# Patient Record
Sex: MALE | Race: WHITE | Employment: FULL TIME | URBAN - METROPOLITAN AREA
[De-identification: names, ages, dates, MRNs, and addresses within clinical notes are randomized per-mention and may not be internally consistent; named-entity substitution may affect disease eponyms.]

---

## 2017-04-03 DIAGNOSIS — E78.5 HYPERLIPIDEMIA LDL GOAL <130: ICD-10-CM

## 2017-04-03 DIAGNOSIS — K21.9 GASTROESOPHAGEAL REFLUX DISEASE WITHOUT ESOPHAGITIS: ICD-10-CM

## 2017-04-04 RX ORDER — SIMVASTATIN 40 MG
40 TABLET ORAL AT BEDTIME
Qty: 30 TABLET | Refills: 0 | Status: SHIPPED | OUTPATIENT
Start: 2017-04-04 | End: 2017-05-04

## 2017-04-04 NOTE — TELEPHONE ENCOUNTER
simvastatin (ZOCOR) 40 MG tablet     Last Written Prescription Date: 03/17/2016  Last Fill Quantity: 90, # refills: 03  Last Office Visit with [facility], [facility] or [facility] prescribing provider: 03/17/2016       Lab Results   Component Value Date    CHOL 168 03/16/2016     Lab Results   Component Value Date    HDL 34 03/16/2016     Lab Results   Component Value Date    LDL 79 03/16/2016     Lab Results   Component Value Date    TRIG 275 03/16/2016     Lab Results   Component Value Date    CHOLHDLRPAULIEO 4.2 11/24/2014

## 2017-04-07 RX ORDER — OMEPRAZOLE 40 MG/1
40 CAPSULE, DELAYED RELEASE ORAL DAILY
Qty: 30 CAPSULE | Refills: 0 | Status: SHIPPED | OUTPATIENT
Start: 2017-04-07 | End: 2017-05-04

## 2017-04-07 NOTE — TELEPHONE ENCOUNTER
omeprazole (PRILOSEC) 40 MG capsule      Last Written Prescription Date: 3/17/16  Last Fill Quantity: 90,  # refills: 0   Last Office Visit with [facility], [facility] or [facility] prescribing provider: 3/17/16

## 2017-04-07 NOTE — TELEPHONE ENCOUNTER
Routing refill request to provider for review/approval because:  Pam given x1 and patient did not follow up, please advise  Patient needs to be seen because it has been more than 1 year since last office visit.

## 2017-05-04 ENCOUNTER — OFFICE VISIT (OUTPATIENT)
Dept: INTERNAL MEDICINE | Facility: CLINIC | Age: 67
End: 2017-05-04
Payer: COMMERCIAL

## 2017-05-04 VITALS
SYSTOLIC BLOOD PRESSURE: 138 MMHG | TEMPERATURE: 98.7 F | OXYGEN SATURATION: 98 % | HEART RATE: 72 BPM | WEIGHT: 169.1 LBS | BODY MASS INDEX: 25.04 KG/M2 | DIASTOLIC BLOOD PRESSURE: 72 MMHG | HEIGHT: 69 IN

## 2017-05-04 DIAGNOSIS — Z23 NEED FOR VACCINATION: ICD-10-CM

## 2017-05-04 DIAGNOSIS — I10 ESSENTIAL HYPERTENSION: Primary | ICD-10-CM

## 2017-05-04 DIAGNOSIS — C61 PROSTATE CANCER (H): ICD-10-CM

## 2017-05-04 DIAGNOSIS — K21.9 GASTROESOPHAGEAL REFLUX DISEASE WITHOUT ESOPHAGITIS: ICD-10-CM

## 2017-05-04 DIAGNOSIS — Z12.5 SCREENING PSA (PROSTATE SPECIFIC ANTIGEN): ICD-10-CM

## 2017-05-04 DIAGNOSIS — E78.5 HYPERLIPIDEMIA LDL GOAL <130: ICD-10-CM

## 2017-05-04 LAB
ALBUMIN SERPL-MCNC: 4.3 G/DL (ref 3.4–5)
ALP SERPL-CCNC: 77 U/L (ref 40–150)
ALT SERPL W P-5'-P-CCNC: 23 U/L (ref 0–70)
ANION GAP SERPL CALCULATED.3IONS-SCNC: 7 MMOL/L (ref 3–14)
AST SERPL W P-5'-P-CCNC: 11 U/L (ref 0–45)
BASOPHILS # BLD AUTO: 0.1 10E9/L (ref 0–0.2)
BASOPHILS NFR BLD AUTO: 1.2 %
BILIRUB SERPL-MCNC: 0.4 MG/DL (ref 0.2–1.3)
BUN SERPL-MCNC: 18 MG/DL (ref 7–30)
CALCIUM SERPL-MCNC: 9.3 MG/DL (ref 8.5–10.1)
CHLORIDE SERPL-SCNC: 103 MMOL/L (ref 94–109)
CHOLEST SERPL-MCNC: 186 MG/DL
CO2 SERPL-SCNC: 29 MMOL/L (ref 20–32)
CREAT SERPL-MCNC: 1.03 MG/DL (ref 0.66–1.25)
DIFFERENTIAL METHOD BLD: NORMAL
EOSINOPHIL # BLD AUTO: 0.2 10E9/L (ref 0–0.7)
EOSINOPHIL NFR BLD AUTO: 3.7 %
ERYTHROCYTE [DISTWIDTH] IN BLOOD BY AUTOMATED COUNT: 13.4 % (ref 10–15)
GFR SERPL CREATININE-BSD FRML MDRD: 72 ML/MIN/1.7M2
GLUCOSE SERPL-MCNC: 111 MG/DL (ref 70–99)
HCT VFR BLD AUTO: 44.2 % (ref 40–53)
HDLC SERPL-MCNC: 38 MG/DL
HGB BLD-MCNC: 14.9 G/DL (ref 13.3–17.7)
LDLC SERPL CALC-MCNC: 83 MG/DL
LYMPHOCYTES # BLD AUTO: 1.7 10E9/L (ref 0.8–5.3)
LYMPHOCYTES NFR BLD AUTO: 26.2 %
MCH RBC QN AUTO: 30.3 PG (ref 26.5–33)
MCHC RBC AUTO-ENTMCNC: 33.7 G/DL (ref 31.5–36.5)
MCV RBC AUTO: 90 FL (ref 78–100)
MONOCYTES # BLD AUTO: 0.7 10E9/L (ref 0–1.3)
MONOCYTES NFR BLD AUTO: 10.6 %
NEUTROPHILS # BLD AUTO: 3.7 10E9/L (ref 1.6–8.3)
NEUTROPHILS NFR BLD AUTO: 58.3 %
NONHDLC SERPL-MCNC: 148 MG/DL
PLATELET # BLD AUTO: 231 10E9/L (ref 150–450)
POTASSIUM SERPL-SCNC: 3.9 MMOL/L (ref 3.4–5.3)
PROT SERPL-MCNC: 7.7 G/DL (ref 6.8–8.8)
PSA SERPL-ACNC: NORMAL UG/L (ref 0–4)
RBC # BLD AUTO: 4.92 10E12/L (ref 4.4–5.9)
SODIUM SERPL-SCNC: 139 MMOL/L (ref 133–144)
TRIGL SERPL-MCNC: 323 MG/DL
WBC # BLD AUTO: 6.4 10E9/L (ref 4–11)

## 2017-05-04 PROCEDURE — 80053 COMPREHEN METABOLIC PANEL: CPT | Performed by: INTERNAL MEDICINE

## 2017-05-04 PROCEDURE — 90670 PCV13 VACCINE IM: CPT | Performed by: INTERNAL MEDICINE

## 2017-05-04 PROCEDURE — 99214 OFFICE O/P EST MOD 30 MIN: CPT | Mod: 25 | Performed by: INTERNAL MEDICINE

## 2017-05-04 PROCEDURE — 85025 COMPLETE CBC W/AUTO DIFF WBC: CPT | Performed by: INTERNAL MEDICINE

## 2017-05-04 PROCEDURE — 36415 COLL VENOUS BLD VENIPUNCTURE: CPT | Performed by: INTERNAL MEDICINE

## 2017-05-04 PROCEDURE — G0103 PSA SCREENING: HCPCS | Performed by: INTERNAL MEDICINE

## 2017-05-04 PROCEDURE — 90471 IMMUNIZATION ADMIN: CPT | Performed by: INTERNAL MEDICINE

## 2017-05-04 PROCEDURE — 80061 LIPID PANEL: CPT | Performed by: INTERNAL MEDICINE

## 2017-05-04 RX ORDER — SIMVASTATIN 40 MG
40 TABLET ORAL AT BEDTIME
Qty: 90 TABLET | Refills: 3 | Status: SHIPPED | OUTPATIENT
Start: 2017-05-04 | End: 2017-10-09

## 2017-05-04 RX ORDER — LISINOPRIL AND HYDROCHLOROTHIAZIDE 12.5; 2 MG/1; MG/1
2 TABLET ORAL EVERY MORNING
Qty: 180 TABLET | Refills: 3 | Status: SHIPPED | OUTPATIENT
Start: 2017-05-04 | End: 2017-10-09

## 2017-05-04 RX ORDER — OMEPRAZOLE 40 MG/1
40 CAPSULE, DELAYED RELEASE ORAL DAILY
Qty: 90 CAPSULE | Refills: 3 | Status: SHIPPED | OUTPATIENT
Start: 2017-05-04 | End: 2017-10-09

## 2017-05-04 NOTE — PATIENT INSTRUCTIONS
"*  Continue all medications at the same doses.  Contact your usual pharmacy if you need refills.     *  Colonoscopy next due 2022    *  Return to see me in 1 year, sooner if needed.  Call [phone] to schedule this appointment.         5 GOALS TO PREVENT VASCULAR DISEASE:     1.  Aggressive blood pressure control, under 130/80 ideally.  Using medications if needed.    Your blood pressure is under good control    BP Readings from Last 4 Encounters:   05/04/17 138/72   03/17/16 126/66   12/01/14 132/74   10/07/13 121/59       2.  Aggressive LDL cholesterol (\"bad cholesterol\") lowering as indicated.    Your goal is an LDL under 130 for sure, preferably under 100.  (If you have diabetes or previous vascular disease, the the LDL goals would be under 100 for sure, preferably under 70.)    New guidelines identify four high-risk groups who could benefit from statins:   *people with pre-existing heart disease, such as those who have had a heart attack;   *people ages 40 to 75 who have diabetes of any type  *patients ages 40 to 75 with at least a 7.5% risk of developing cardiovascular disease over the next decade, according to a formula described in the guidelines  *patients with the sort of super-high cholesterol that sometimes runs in families, as evidenced by an LDL of 190 milligrams per deciliter or higher    Your cholesterol levels are well controlled.    Recent Labs   Lab Test  03/16/16   0837  11/24/14   0813  08/13/13   0927   CHOL  168  170  181   HDL  34*  40*  49   LDL  79  68  95   TRIG  275*  308*  182*   CHOLHDLRATIO   --   4.2  3.7       3.  Aggressive diabetic prevention, screening and/or management.      You do not have diabetes as of the most recent blood tests.     4.  No smoking    5.  Consider taking low dose aspirin (81 mg) tablet once per day over the age of 50, every day unless there is a specific reason that you cannot take aspirin (such as side effect, allergy, or you are on another \"blood " "thinner\").        --Based on your current cardiac risk factors, you should take Aspirin 81 mg once per day if you are over 50 years of age.         "

## 2017-05-04 NOTE — PROGRESS NOTES
SUBJECTIVE:                                                    Willis De La Cruz is a 66 year old male who presents to clinic today for the following health issues:    Pt is fasting    Hyperlipidemia Follow-Up      Rate your low fat/cholesterol diet?: fair    Taking statin?  Yes, no muscle aches from statin    Other lipid medications/supplements?:  None    Has history of hyperlipidemia.  On statin for this, denies any significant side effects of this medication.      Latest labs reviewed:    Recent Labs   Lab Test  05/04/17   0932  03/16/16   0837  11/24/14   0813  08/13/13   0927   CHOL  186  168  170  181   HDL  38*  34*  40*  49   LDL  83  79  68  95   TRIG  323*  275*  308*  182*   CHOLHDLRATIO   --    --   4.2  3.7        Lab Results   Component Value Date    AST 11 05/04/2017          Hypertension Follow-up      Outpatient blood pressures are being checked at home.  Results are 120's/80's.    Low Salt Diet: low salt    Blood presure remains well controlled at home  Readings outside clinic are within normal limits.  Reviewed last 6 BP readings in chart:  BP Readings from Last 6 Encounters:   05/04/17 138/72   03/17/16 126/66   12/01/14 132/74   10/07/13 121/59   10/01/13 122/70   09/26/13 132/70     He has not experienced any significant side effects from medicaiotns for hypertension.    NO active cardiac complaints or symptoms with exercise.        Amount of exercise or physical activity: walks everyday    Problems taking medications regularly: No    Medication side effects: none    Diet: low salt and low fat/cholesterol    Medication Followup of Omeprazole    Taking Medication as prescribed: yes    Side Effects:  None    Medication Helping Symptoms:  Yes    GERD stable.  Taking meds as ordered, no reported side effects from medicines.  Eating properly to avoid sx.   No melena, no hematochezia, no diarrhea.  No unintended weigth loss.            Problem list and histories reviewed & adjusted, as  indicated.  Additional history: as documented        Reviewed and updated as needed this visit by clinical staff  Tobacco  Allergies       Reviewed and updated as needed this visit by Provider           Past Medical History:  ---------------------------  Past Medical History:   Diagnosis Date     Dysmetabolic syndrome X      Esophageal reflux      Hyperlipidaemia      Other and unspecified hyperlipidemia      Prostate cancer (H) 4/10     Unspecified essential hypertension        Past Surgical History:  ---------------------------  Past Surgical History:   Procedure Laterality Date     COLONOSCOPY  4/28/2012    Procedure:COLONOSCOPY; COLONOSCOPY; Surgeon:ERNIE ESPINOSA; Location: GI      ECHO HEART XTHORACIC, STRESS/REST  3/01    Negative, at [place]     HERNIORRHAPHY INGUINAL  10/7/2013    Procedure: HERNIORRHAPHY INGUINAL;  RIGHT INGUINAL HERNIA REPAIR WITH MESH;  Surgeon: Marcus Becker MD;  Location: [facility]     PROSTATE SURGERY  2010       Current Medications:  ---------------------------  Current Outpatient Prescriptions   Medication Sig Dispense Refill     simvastatin (ZOCOR) 40 MG tablet Take 1 tablet (40 mg) by mouth At Bedtime 90 tablet 3     omeprazole (PRILOSEC) 40 MG capsule Take 1 capsule (40 mg) by mouth daily 90 capsule 3     lisinopril-hydrochlorothiazide (PRINZIDE/ZESTORETIC) 20-12.5 MG per tablet Take 2 tablets by mouth every morning 180 tablet 3     ASPIRIN NOT PRESCRIBED (INTENTIONAL) Antiplatelet medication not prescribed intentionally due to Allergy 0 each        Allergies:  -------------  Allergies   Allergen Reactions     Aspirin Hives     Nsaids Hives       Social History:  -------------------  Social History     Social History     Marital status:      Spouse name: N/A     Number of children: N/A     Years of education: N/A     Occupational History     Not on file.     Social History Main Topics     Smoking status: Never Smoker     Smokeless tobacco: Never Used      "Alcohol use Yes      Comment: RARELY     Drug use: No     Sexual activity: Yes     Other Topics Concern     Not on file     Social History Narrative       Family Medical History:  ------------------------------  Family History   Problem Relation Age of Onset     CANCER Father      d age 45 renal cancer     Hypertension Mother      d  age 48 strokes     CEREBROVASCULAR DISEASE Mother      Hypertension Brother      b.  1946  age 59 from cardiomyopathy     Hypertension Brother      b. 1947     Hypertension Sister      b.           ROS:  REVIEW OF SYSTEMS:    RESP: negative for cough, dyspnea, wheezing, hemoptysis  CV: negative for chest pain, palpitations, PND, MARSHALL, orthopnea; reports no changes in their ability to perform physical activity (from cardiovascular standpoint)  GI: negative for dysphagia, N/V, pain, melena, diarrhea and constipation  NEURO: negative for numbness/tingling, paralysis, incoordination, or focal weakness     OBJECTIVE:                                                    /72  Pulse 72  Temp 98.7  F (37.1  C) (Oral)  Ht 5' 9\" (1.753 m)  Wt 169 lb 1.6 oz (76.7 kg)  SpO2 98%  BMI 24.97 kg/m2     GENERAL alert and no distress  EYES:  Normal sclera,conjunctiva, EOMI  HENT: oral and posterior pharynx without lesions or erythema, facies symmetric  NECK: Neck supple. No LAD, without thyroidmegaly or JVD., Carotids without bruits.  RESP: Clear to ausculation bilaterally without wheezes or crackles. Normal BS in all fields.  CV: RRR normal S1S2 without murmurs, rubs or gallops. PMI normal  LYMPH: no cervical lymph adenopathy appreciated  MS: extremities- no gross deformities of the visible extremities noted, no edema  PSYCH: Alert and oriented times 3; speech- coherent  SKIN:  No obvious significant skin lesions on visible portions of face          ASSESSMENT/PLAN:                                                      (I10) Essential hypertension  (primary encounter diagnosis)  Comment: " This condition is currently controlled on the current medical regimen.  Continue current therapy.   Discussed hypertension in detail including JNC VIII guidelines for blood pressure goals.  Discussed indication for treatment and treatment options.  Discussed the importance for aggressive management of HTN to prevent vascular complications later.  Recommended lower fat, lower carbohydrate, and lower sodium (<2000 mg)diet.  Discussed required intervals for follow up on HTN, lab studies, and the need to aggresive management of other cardiac disease risk factors.  Recommened pt. follow their blood pressures outside the clinic to ensure that BPs are remaining within guidelines, and to contact me if the readings are not within guidelines so we can adjust treatment as needed.   Plan: lisinopril-hydrochlorothiazide         (PRINZIDE/ZESTORETIC) 20-12.5 MG per tablet            (C61) Prostate cancer (H)  Comment: stable since prostate removal.   Plan: PSA, screen            (E78.5) Hyperlipidemia LDL goal <130  Comment: Discussed current lipid results, previous results (if available) current guidelines (NCEP) for treatment and goals for lipids.  Discussed lifestyle modification, dietary changes (low fat, low simple carb) and regular aerobic exercise.  Discussed the link between dysmetabolic syndrome and impaired glucose tolerance seen in certain patterns of lipids.  Briefly discussed medication used for lipid lowering, including the statins are their possible side effects of myalgias, rhabdomyolysis, and liver toxicity.   Plan: simvastatin (ZOCOR) 40 MG tablet            (K21.9) Gastroesophageal reflux disease without esophagitis  Comment: This condition is currently controlled on the current medical regimen.  Continue current therapy.   Plan: omeprazole (PRILOSEC) 40 MG capsule            (Z23) Need for vaccination  Comment:   Plan: PNEUMOCOCCAL CONJ VACCINE 13 VALENT IM            (Z12.5) Screening PSA (prostate specific  antigen)  Comment:   Plan: PSA, screen              See Patient Instructions    KRYSTLE ASTORGA M.D., MD  [facility]

## 2017-05-04 NOTE — NURSING NOTE
"Chief Complaint   Patient presents with     Hypertension     med recheck     Lipids     med recheck     Gastrophageal Reflux     med recheck       Initial /72  Pulse 72  Temp 98.7  F (37.1  C) (Oral)  Ht 5' 9\" (1.753 m)  Wt 169 lb 1.6 oz (76.7 kg)  SpO2 98%  BMI 24.97 kg/m2 Estimated body mass index is 24.97 kg/(m^2) as calculated from the following:    Height as of this encounter: 5' 9\" (1.753 m).    Weight as of this encounter: 169 lb 1.6 oz (76.7 kg).  Medication Reconciliation: complete   Lucita Jacob CMA    Screening Questionnaire for Adult Immunization    Are you sick today?   No   Do you have allergies to medications, food, a vaccine component or latex?   Yes   Have you ever had a serious reaction after receiving a vaccination?   No   Do you have a long-term health problem with heart disease, lung disease, asthma, kidney disease, metabolic disease (e.g. diabetes), anemia, or other blood disorder?   Yes   Do you have cancer, leukemia, HIV/AIDS, or any other immune system problem?   Yes   In the past 3 months, have you taken medications that affect  your immune system, such as prednisone, other steroids, or anticancer drugs; drugs for the treatment of rheumatoid arthritis, Crohn s disease, or psoriasis; or have you had radiation treatments?   No   Have you had a seizure, or a brain or other nervous system problem?   No   During the past year, have you received a transfusion of blood or blood     products, or been given immune (gamma) globulin or antiviral drug?   No   For women: Are you pregnant or is there a chance you could become        pregnant during the next month?   No   Have you received any vaccinations in the past 4 weeks?   No     Immunization questionnaire was positive for at least one answer.  Notified Smith.      MNVFC doesn't apply on this patient    Per orders of Dr. Espinoza, injection of prevnar 13 given by Lucita Jacob. Patient instructed to remain in clinic for 20 minutes " afterwards, and to report any adverse reaction to me immediately.       Screening performed by Lucita Jacob on 5/4/2017 at 8:54 AM.

## 2017-05-04 NOTE — MR AVS SNAPSHOT
"              After Visit Summary   5/4/2017    Willis De La Cruz    MRN: [id number]           Patient Information     Date Of Birth          1950        Visit Information        Provider Department      5/4/2017 8:40 AM Quintin Espinoza MD [facility]        Today's Diagnoses     Essential hypertension    -  1    Prostate cancer (H)        Hyperlipidemia LDL goal <130        Gastroesophageal reflux disease without esophagitis        Need for vaccination        Screening PSA (prostate specific antigen)          Care Instructions    *  Continue all medications at the same doses.  Contact your usual pharmacy if you need refills.     *  Colonoscopy next due 2022    *  Return to see me in 1 year, sooner if needed.  Call [phone] to schedule this appointment.         5 GOALS TO PREVENT VASCULAR DISEASE:     1.  Aggressive blood pressure control, under 130/80 ideally.  Using medications if needed.    Your blood pressure is under good control    BP Readings from Last 4 Encounters:   05/04/17 138/72   03/17/16 126/66   12/01/14 132/74   10/07/13 121/59       2.  Aggressive LDL cholesterol (\"bad cholesterol\") lowering as indicated.    Your goal is an LDL under 130 for sure, preferably under 100.  (If you have diabetes or previous vascular disease, the the LDL goals would be under 100 for sure, preferably under 70.)    New guidelines identify four high-risk groups who could benefit from statins:   *people with pre-existing heart disease, such as those who have had a heart attack;   *people ages 40 to 75 who have diabetes of any type  *patients ages 40 to 75 with at least a 7.5% risk of developing cardiovascular disease over the next decade, according to a formula described in the guidelines  *patients with the sort of super-high cholesterol that sometimes runs in families, as evidenced by an LDL of 190 milligrams per deciliter or higher    Your cholesterol levels are well " "controlled.    Recent Labs   Lab Test  03/16/16   0837  11/24/14   0813  08/13/13   0927   CHOL  168  170  181   HDL  34*  40*  49   LDL  79  68  95   TRIG  275*  308*  182*   CHOLHDLRATIO   --   4.2  3.7       3.  Aggressive diabetic prevention, screening and/or management.      You do not have diabetes as of the most recent blood tests.     4.  No smoking    5.  Consider taking low dose aspirin (81 mg) tablet once per day over the age of 50, every day unless there is a specific reason that you cannot take aspirin (such as side effect, allergy, or you are on another \"blood thinner\").        --Based on your current cardiac risk factors, you should take Aspirin 81 mg once per day if you are over 50 years of age.               Follow-ups after your visit        Who to contact     If you have questions or need follow up information about today's clinic visit or your schedule please contact [facility] directly at [phone].  Normal or non-critical lab and imaging results will be communicated to you by GetBulbhart, letter or phone within 4 business days after the clinic has received the results. If you do not hear from us within 7 days, please contact the clinic through Nutmeg or phone. If you have a critical or abnormal lab result, we will notify you by phone as soon as possible.  Submit refill requests through Nutmeg or call your pharmacy and they will forward the refill request to us. Please allow 3 business days for your refill to be completed.          Additional Information About Your Visit        Nutmeg Information     Nutmeg gives you secure access to your electronic health record. If you see a primary care provider, you can also send messages to your care team and make appointments. If you have questions, please call your primary care clinic.  If you do not have a primary care provider, please call [phone] and they will assist you.        Care EveryWhere ID     This is your Care " "EveryWhere ID. This could be used by other organizations to access your [place] medical records  [id number]        Your Vitals Were     Pulse Temperature Height Pulse Oximetry BMI (Body Mass Index)       72 98.7  F (37.1  C) (Oral) 5' 9\" (1.753 m) 98% 24.97 kg/m2        Blood Pressure from Last 3 Encounters:   05/04/17 138/72   03/17/16 126/66   12/01/14 132/74    Weight from Last 3 Encounters:   05/04/17 169 lb 1.6 oz (76.7 kg)   03/17/16 165 lb 8 oz (75.1 kg)   12/01/14 171 lb (77.6 kg)              We Performed the Following     CBC with platelets and differential     Comprehensive metabolic panel     Lipid Profile with reflex to direct LDL     PNEUMOCOCCAL CONJ VACCINE 13 VALENT IM     PSA, screen          Where to get your medicines      These medications were sent to [facility] [address]  [address] [address]     Phone:  [phone]     lisinopril-hydrochlorothiazide 20-12.5 MG per tablet    omeprazole 40 MG capsule    simvastatin 40 MG tablet          Primary Care Provider Office Phone # Fax #    Quintin Espinoza [phone]       [facility] [address] [place] [address]        Thank you!     Thank you for choosing [facility]  for your care. Our goal is always to provide you with excellent care. Hearing back from our patients is one way we can continue to improve our services. Please take a few minutes to complete the written survey that you may receive in the mail after your visit with us. Thank you!             Your Updated Medication List - Protect others around you: Learn how to safely use, store and throw away your medicines at www.disposemymeds.org.          This list is accurate as of: 5/4/17  9:23 AM.  Always use your most recent med list.                   Brand Name Dispense Instructions for use    ASPIRIN NOT PRESCRIBED    INTENTIONAL    0 each    Antiplatelet medication not " prescribed intentionally due to Allergy       lisinopril-hydrochlorothiazide 20-12.5 MG per tablet    PRINZIDE/ZESTORETIC    180 tablet    Take 2 tablets by mouth every morning       omeprazole 40 MG capsule    priLOSEC    90 capsule    Take 1 capsule (40 mg) by mouth daily       simvastatin 40 MG tablet    ZOCOR    90 tablet    Take 1 tablet (40 mg) by mouth At Bedtime

## 2017-10-09 DIAGNOSIS — E78.5 HYPERLIPIDEMIA LDL GOAL <130: ICD-10-CM

## 2017-10-09 DIAGNOSIS — I10 ESSENTIAL HYPERTENSION: ICD-10-CM

## 2017-10-09 DIAGNOSIS — K21.9 GASTROESOPHAGEAL REFLUX DISEASE WITHOUT ESOPHAGITIS: ICD-10-CM

## 2017-10-09 NOTE — TELEPHONE ENCOUNTER
lisinopril-hydrochlorothiazide (PRINZIDE/ZESTORETIC) 20-12.5 MG per tablet      Last Written Prescription Date: 5/4/17  Last Fill Quantity: 180, # refills: 3  [place] [facility] or [facility] prescribing provider: 5/4/17       Potassium   Date Value Ref Range Status   05/04/2017 3.9 3.4 - 5.3 mmol/L Final     Creatinine   Date Value Ref Range Status   05/04/2017 1.03 0.66 - 1.25 mg/dL Final     BP Readings from Last 3 Encounters:   05/04/17 138/72   03/17/16 126/66   12/01/14 132/74     omeprazole (PRILOSEC) 40 MG capsule      Last Written Prescription Date: 5/4/17  Last Fill Quantity: 90,  # refills: 3   [place] [facility] or [facility] prescribing provider: 5/4/17    simvastatin (ZOCOR) 40 MG tablet     Last Written Prescription Date: 5/4/17  Last Fill Quantity: 90, # refills: 3  Last [place] Visit [place] [facility] or [facility] prescribing provider: 5/4/17       Lab Results   Component Value Date    CHOL 186 05/04/2017     Lab Results   Component Value Date    HDL 38 05/04/2017     Lab Results   Component Value Date    LDL 83 05/04/2017     Lab Results   Component Value Date    TRIG 323 05/04/2017     Lab Results   Component Value Date    CHOLHDLRATIO 4.2 11/24/2014

## 2017-10-10 RX ORDER — OMEPRAZOLE 40 MG/1
40 CAPSULE, DELAYED RELEASE ORAL DAILY
Qty: 90 CAPSULE | Refills: 1 | Status: SHIPPED | OUTPATIENT
Start: 2017-10-10 | End: 2018-04-08

## 2017-10-10 RX ORDER — LISINOPRIL AND HYDROCHLOROTHIAZIDE 12.5; 2 MG/1; MG/1
2 TABLET ORAL EVERY MORNING
Qty: 180 TABLET | Refills: 1 | Status: SHIPPED | OUTPATIENT
Start: 2017-10-10 | End: 2018-04-08

## 2017-10-10 RX ORDER — SIMVASTATIN 40 MG
40 TABLET ORAL AT BEDTIME
Qty: 90 TABLET | Refills: 1 | Status: SHIPPED | OUTPATIENT
Start: 2017-10-10 | End: 2018-04-08

## 2017-10-11 ENCOUNTER — TELEPHONE (OUTPATIENT)
Dept: INTERNAL MEDICINE | Facility: CLINIC | Age: 67
End: 2017-10-11

## 2017-10-11 DIAGNOSIS — K21.9 GASTROESOPHAGEAL REFLUX DISEASE WITHOUT ESOPHAGITIS: ICD-10-CM

## 2017-10-11 DIAGNOSIS — E78.5 HYPERLIPIDEMIA LDL GOAL <130: ICD-10-CM

## 2017-10-11 RX ORDER — OMEPRAZOLE 40 MG/1
40 CAPSULE, DELAYED RELEASE ORAL DAILY
Qty: 90 CAPSULE | Refills: 1 | OUTPATIENT
Start: 2017-10-11

## 2017-10-11 NOTE — TELEPHONE ENCOUNTER
simvastatin (ZOCOR) 40 MG tablet     Last Written Prescription Date: 5/4/17  Last Fill Quantity: 90, # refills: 1  Last Office Visit with [facility], [facility] or [facility] prescribing provider: 10/10/17       Lab Results   Component Value Date    CHOL 186 05/04/2017     Lab Results   Component Value Date    HDL 38 05/04/2017     Lab Results   Component Value Date    LDL 83 05/04/2017     Lab Results   Component Value Date    TRIG 323 05/04/2017     Lab Results   Component Value Date    CHOLHDLRPAULIEO 4.2 11/24/2014

## 2017-10-11 NOTE — TELEPHONE ENCOUNTER
omeprazole (PRILOSEC) 40 MG capsule      Last Written Prescription Date: 5/4/17  Last Fill Quantity: 90,  # refills: 1   Last Office Visit with [facility], [facility] or [facility] prescribing provider: 10/10/17

## 2018-04-08 DIAGNOSIS — K21.9 GASTROESOPHAGEAL REFLUX DISEASE WITHOUT ESOPHAGITIS: ICD-10-CM

## 2018-04-08 DIAGNOSIS — E78.5 HYPERLIPIDEMIA LDL GOAL <130: ICD-10-CM

## 2018-04-08 DIAGNOSIS — I10 ESSENTIAL HYPERTENSION: ICD-10-CM

## 2018-04-10 RX ORDER — SIMVASTATIN 40 MG
TABLET ORAL
Qty: 90 TABLET | Refills: 0 | Status: SHIPPED | OUTPATIENT
Start: 2018-04-10 | End: 2018-07-09

## 2018-04-10 RX ORDER — OMEPRAZOLE 40 MG/1
CAPSULE, DELAYED RELEASE ORAL
Qty: 90 CAPSULE | Refills: 0 | Status: SHIPPED | OUTPATIENT
Start: 2018-04-10 | End: 2018-07-09

## 2018-04-10 RX ORDER — LISINOPRIL AND HYDROCHLOROTHIAZIDE 12.5; 2 MG/1; MG/1
TABLET ORAL
Qty: 180 TABLET | Refills: 0 | Status: SHIPPED | OUTPATIENT
Start: 2018-04-10 | End: 2018-07-09

## 2018-04-10 NOTE — TELEPHONE ENCOUNTER
"Prescription approved per [facility]ill Protocol.    Requested Prescriptions   Pending Prescriptions Disp Refills     lisinopril-hydrochlorothiazide (PRINZIDE/ZESTORETIC) 20-12.5 MG per tablet [Pharmacy Med Name: LISINOPRIL/HCTZ 20/12.5M TA 20-12.5 TAB] 180 tablet 1     Sig: TAKE 2 TABLETS BY MOUTH EVERY MORNING    Diuretics (Including Combos) Protocol Passed    4/8/2018 10:29 AM       Passed - Blood pressure under 140/90 in past 12 months    BP Readings from Last 3 Encounters:   05/04/17 138/72   03/17/16 126/66   12/01/14 132/74                Passed - Recent (12 mo) or future (30 days) visit within the authorizing provider's specialty    Patient had office visit in the last 12 months or has a visit in the next 30 days with authorizing provider or within the authorizing provider's specialty.  See \"Patient Info\" tab in inbasket, or \"Choose Columns\" in Meds & Orders section of the refill encounter.           Passed - Patient is age 18 or older       Passed - Normal serum creatinine on file in past 12 months    Recent Labs   Lab Test  05/04/17   0932   CR  1.03             Passed - Normal serum potassium on file in past 12 months    Recent Labs   Lab Test  05/04/17   0932   POTASSIUM  3.9                   Passed - Normal serum sodium on file in past 12 months    Recent Labs   Lab Test  05/04/17   0932   NA  139              omeprazole (PRILOSEC) 40 MG capsule [Pharmacy Med Name: OMEPRAZOLE 40MG CAP 40 CAP] 90 capsule 1     Sig: TAKE 1 CAPSULE (40 MG) BY MOUTH DAILY    PPI Protocol Passed    4/8/2018 10:29 AM       Passed - Not on Clopidogrel (unless Pantoprazole ordered)       Passed - No diagnosis of osteoporosis on record       Passed - Recent (12 mo) or future (30 days) visit within the authorizing provider's specialty    Patient had office visit in the last 12 months or has a visit in the next 30 days with authorizing provider or within the authorizing provider's specialty.  See \"Patient Info\" tab in inbasket, or " "\"Choose Columns\" in Meds & Orders section of the refill encounter.           Passed - Patient is age 18 or older        simvastatin (ZOCOR) 40 MG tablet [Pharmacy Med Name: SIMVASTATIN 40MG TAB 40 TAB] 90 tablet 1     Sig: TAKE 1 TABLET (40 MG) BY MOUTH AT BEDTIME    Statins Protocol Passed    4/8/2018 10:29 AM       Passed - LDL on file in past 12 months    Recent Labs   Lab Test  05/04/17   0932   LDL  83            Passed - No abnormal creatine kinase in past 12 months    No lab results found.            Passed - Recent (12 mo) or future (30 days) visit within the authorizing provider's specialty    Patient had office visit in the last 12 months or has a visit in the next 30 days with authorizing provider or within the authorizing provider's specialty.  See \"Patient Info\" tab in inbasket, or \"Choose Columns\" in Meds & Orders section of the refill encounter.           Passed - Patient is age 18 or older          "

## 2018-07-09 ENCOUNTER — OFFICE VISIT (OUTPATIENT)
Dept: INTERNAL MEDICINE | Facility: CLINIC | Age: 68
End: 2018-07-09
Payer: COMMERCIAL

## 2018-07-09 VITALS
DIASTOLIC BLOOD PRESSURE: 76 MMHG | TEMPERATURE: 98.2 F | HEIGHT: 69 IN | BODY MASS INDEX: 25.74 KG/M2 | OXYGEN SATURATION: 98 % | RESPIRATION RATE: 10 BRPM | HEART RATE: 70 BPM | SYSTOLIC BLOOD PRESSURE: 136 MMHG | WEIGHT: 173.8 LBS

## 2018-07-09 DIAGNOSIS — I10 ESSENTIAL HYPERTENSION: ICD-10-CM

## 2018-07-09 DIAGNOSIS — C61 PROSTATE CANCER (H): ICD-10-CM

## 2018-07-09 DIAGNOSIS — K21.9 GASTROESOPHAGEAL REFLUX DISEASE WITHOUT ESOPHAGITIS: ICD-10-CM

## 2018-07-09 DIAGNOSIS — Z00.00 MEDICARE ANNUAL WELLNESS VISIT, SUBSEQUENT: Primary | ICD-10-CM

## 2018-07-09 DIAGNOSIS — Z12.5 SPECIAL SCREENING FOR MALIGNANT NEOPLASM OF PROSTATE: ICD-10-CM

## 2018-07-09 DIAGNOSIS — E78.5 HYPERLIPIDEMIA LDL GOAL <130: ICD-10-CM

## 2018-07-09 DIAGNOSIS — Z11.59 NEED FOR HEPATITIS C SCREENING TEST: ICD-10-CM

## 2018-07-09 DIAGNOSIS — Z23 NEED FOR PROPHYLACTIC VACCINATION AGAINST STREPTOCOCCUS PNEUMONIAE (PNEUMOCOCCUS): ICD-10-CM

## 2018-07-09 LAB
ALT SERPL W P-5'-P-CCNC: 29 U/L (ref 0–70)
ANION GAP SERPL CALCULATED.3IONS-SCNC: 8 MMOL/L (ref 3–14)
AST SERPL W P-5'-P-CCNC: 17 U/L (ref 0–45)
BUN SERPL-MCNC: 16 MG/DL (ref 7–30)
CALCIUM SERPL-MCNC: 9.2 MG/DL (ref 8.5–10.1)
CHLORIDE SERPL-SCNC: 102 MMOL/L (ref 94–109)
CHOLEST SERPL-MCNC: 202 MG/DL
CO2 SERPL-SCNC: 29 MMOL/L (ref 20–32)
CREAT SERPL-MCNC: 1.09 MG/DL (ref 0.66–1.25)
GFR SERPL CREATININE-BSD FRML MDRD: 67 ML/MIN/1.7M2
GLUCOSE SERPL-MCNC: 122 MG/DL (ref 70–99)
HCV AB SERPL QL IA: NONREACTIVE
HDLC SERPL-MCNC: 40 MG/DL
HGB BLD-MCNC: 16.2 G/DL (ref 13.3–17.7)
LDLC SERPL CALC-MCNC: 91 MG/DL
NONHDLC SERPL-MCNC: 162 MG/DL
POTASSIUM SERPL-SCNC: 3.6 MMOL/L (ref 3.4–5.3)
PSA SERPL-ACNC: <0.01 UG/L (ref 0–4)
SODIUM SERPL-SCNC: 139 MMOL/L (ref 133–144)
TRIGL SERPL-MCNC: 355 MG/DL

## 2018-07-09 PROCEDURE — 84450 TRANSFERASE (AST) (SGOT): CPT | Performed by: INTERNAL MEDICINE

## 2018-07-09 PROCEDURE — 85018 HEMOGLOBIN: CPT | Performed by: INTERNAL MEDICINE

## 2018-07-09 PROCEDURE — 90471 IMMUNIZATION ADMIN: CPT | Performed by: INTERNAL MEDICINE

## 2018-07-09 PROCEDURE — 99397 PER PM REEVAL EST PAT 65+ YR: CPT | Mod: 25 | Performed by: INTERNAL MEDICINE

## 2018-07-09 PROCEDURE — 90732 PPSV23 VACC 2 YRS+ SUBQ/IM: CPT | Performed by: INTERNAL MEDICINE

## 2018-07-09 PROCEDURE — 36415 COLL VENOUS BLD VENIPUNCTURE: CPT | Performed by: INTERNAL MEDICINE

## 2018-07-09 PROCEDURE — 80048 BASIC METABOLIC PNL TOTAL CA: CPT | Performed by: INTERNAL MEDICINE

## 2018-07-09 PROCEDURE — 86803 HEPATITIS C AB TEST: CPT | Performed by: INTERNAL MEDICINE

## 2018-07-09 PROCEDURE — 80061 LIPID PANEL: CPT | Performed by: INTERNAL MEDICINE

## 2018-07-09 PROCEDURE — G0103 PSA SCREENING: HCPCS | Performed by: INTERNAL MEDICINE

## 2018-07-09 PROCEDURE — 84460 ALANINE AMINO (ALT) (SGPT): CPT | Performed by: INTERNAL MEDICINE

## 2018-07-09 RX ORDER — SIMVASTATIN 40 MG
40 TABLET ORAL AT BEDTIME
Qty: 90 TABLET | Refills: 3 | Status: SHIPPED | OUTPATIENT
Start: 2018-07-09 | End: 2019-07-09

## 2018-07-09 RX ORDER — LISINOPRIL AND HYDROCHLOROTHIAZIDE 12.5; 2 MG/1; MG/1
2 TABLET ORAL EVERY MORNING
Qty: 180 TABLET | Refills: 3 | Status: SHIPPED | OUTPATIENT
Start: 2018-07-09 | End: 2019-07-31

## 2018-07-09 RX ORDER — OMEPRAZOLE 40 MG/1
CAPSULE, DELAYED RELEASE ORAL
Qty: 90 CAPSULE | Refills: 0 | Status: CANCELLED | OUTPATIENT
Start: 2018-07-09

## 2018-07-09 RX ORDER — OMEPRAZOLE 40 MG/1
40 CAPSULE, DELAYED RELEASE ORAL DAILY
Qty: 90 CAPSULE | Refills: 3 | Status: SHIPPED | OUTPATIENT
Start: 2018-07-09 | End: 2019-07-31

## 2018-07-09 ASSESSMENT — ACTIVITIES OF DAILY LIVING (ADL)
I_NEED_ASSISTANCE_FOR_THE_FOLLOWING_DAILY_ACTIVITIES:: NO ASSISTANCE IS NEEDED
CURRENT_FUNCTION: NO ASSISTANCE NEEDED

## 2018-07-09 NOTE — PROGRESS NOTES
SUBJECTIVE:   Willis De La Cruz is a 68 year old male who presents for Preventive Visit.    Are you in the first 12 months of your Medicare coverage?  No     Physical   Annual:     Getting at least 3 servings of Calcium per day:  Yes    Bi-annual eye exam:  Yes    Dental care twice a year:  Yes    Sleep apnea or symptoms of sleep apnea:  None    Diet:  Regular (no restrictions) and Breakfast skipped    Frequency of exercise:  1 day/week    Duration of exercise:  Less than 15 minutes    Taking medications regularly:  Yes    Medication side effects:  None    Additional concerns today:  No    Ability to successfully perform activities of daily living: no assistance needed    Home Safety:  No safety concerns identified    Hearing Impairment: difficulty following a conversation in a noisy restaurant or crowded room      1.    Blood presure remains well controlled at home  Readings outside clinic are within normal limits.  Reviewed last 6 BP readings in chart:  BP Readings from Last 6 Encounters:   07/09/18 136/76   05/04/17 138/72   03/17/16 126/66   12/01/14 132/74   10/07/13 121/59   10/01/13 122/70     He has not experienced any significant side effects from medicaiotns for hypertension.    NO active cardiac complaints or symptoms with exercise.     2.  Has history of hyperlipidemia.  On statin for this, denies any significant side effects of this medication.      Latest labs reviewed:    Recent Labs   Lab Test  05/04/17   0932  03/16/16   0837  11/24/14   0813  08/13/13   0927   CHOL  186  168  170  181   HDL  38*  34*  40*  49   LDL  83  79  68  95   TRIG  323*  275*  308*  182*   CHOLHDLRATIO   --    --   4.2  3.7        Lab Results   Component Value Date    AST 11 05/04/2017         3.  history of prostate cancer, s/p [id number].   No  sx at this lalo es  Labs gabby;l    Lab Results   Component Value Date    PSA  05/04/2017     <0.01  Assay Method:  Chemiluminescence using Siemens Vista analyzer      PSA   03/17/2016     <0.01  PSA results are about 7% lower than our prior method due to a methodology change   on August 30, 2011.   Results confirmed by repeat test      PSA <0.10 07/16/2014    PSA 0.04 01/08/2013    PSA <0.04 06/06/2012    PSA 0.04 02/22/2012    PSA < 0.04 11/30/2011    PSA < 0.04 08/30/2011    PSA 0.04 05/23/2011    PSA 0.04 02/28/2011    PSA 0.04 11/29/2010    PSA 0 @ 09/02/2010    PSA 5.49 @ 01/06/2010    PSA 4.49 12/28/2009          Fall risk:  Fallen 2 or more times in the past year?: No  Any fall with injury in the past year?: No    COGNITIVE SCREEN  1) Repeat 3 items (Leader, Season, Table)    2) Clock draw: NORMAL  3) 3 item recall: Recalls 3 objects  Results: 3 items recalled: COGNITIVE IMPAIRMENT LESS LIKELY    Mini-CogTM Copyright [employer]. Licensed by the author for use in [facility]; reprinted with permission (soob@[facility]). All rights reserved.     Reviewed and updated as needed this visit by clinical staff  Tobacco  Allergies  Meds         Reviewed and updated as needed this visit by Provider  Tobacco        Social History   Substance Use Topics     Smoking status: Never Smoker     Smokeless tobacco: Never Used     Alcohol use Yes      Comment: RARELY       Alcohol Use 7/9/2018   If you drink alcohol do you typically have greater than 3 drinks per day OR greater than 7 drinks per week? No     Today's PHQ-2 Score:   PHQ-2 ( 1999 [employer]) 7/9/2018   Q1: Little interest or pleasure in doing things 0   Q2: Feeling down, depressed or hopeless 0   PHQ-2 Score 0   Q1: Little interest or pleasure in doing things Not at all   Q2: Feeling down, depressed or hopeless Not at all   PHQ-2 Score 0       Do you feel safe in your environment - Yes    Do you have a Health Care Directive?: No: Advance care planning reviewed with patient; information given to patient to review.    Current providers sharing in care for this patient include:   Patient Care Team:  Quintin Espinoza MD as  PCP - General    The following health maintenance items are reviewed in Epic and correct as of today:  Health Maintenance   Topic Date Due     HEPATITIS C SCREENING  05/30/1968     AORTIC ANEURYSM SCREENING (SYSTEM ASSIGNED)  05/30/2014     ADVANCE DIRECTIVE PLANNING Q5 YRS  11/26/2017     ALT Q1 YR  05/04/2018     BMP Q1 YR  05/04/2018     FALL RISK ASSESSMENT  05/04/2018     LIPID MONITORING Q1 YEAR  05/04/2018     PNEUMOCOCCAL (2 of 2 - PPSV23) 05/04/2018     PHQ-2 Q1 YR  05/04/2018     INFLUENZA VACCINE (1) 09/01/2018     COLON CANCER SCREEN (SYSTEM ASSIGNED)  04/28/2022     TETANUS IMMUNIZATION (SYSTEM ASSIGNED)  03/17/2026         Past Medical History:  ---------------------------  Past Medical History:   Diagnosis Date     Dysmetabolic syndrome X      Esophageal reflux      Hyperlipidaemia      Other and unspecified hyperlipidemia      Prostate cancer (H) 4/10     Unspecified essential hypertension        Past Surgical History:  ---------------------------  Past Surgical History:   Procedure Laterality Date     COLONOSCOPY  4/28/2012    Procedure:COLONOSCOPY; COLONOSCOPY; Surgeon:ERNIE ESPINOSA; Location: GI     HC ECHO HEART XTHORACIC, STRESS/REST  3/01    Negative, at [place]     HERNIORRHAPHY INGUINAL  10/7/2013    Procedure: HERNIORRHAPHY INGUINAL;  RIGHT INGUINAL HERNIA REPAIR WITH MESH;  Surgeon: Marcus Becker MD;  Location: [facility]     PROSTATE SURGERY  2010       Current Medications:  ---------------------------  Current Outpatient Prescriptions   Medication Sig Dispense Refill     ASPIRIN NOT PRESCRIBED (INTENTIONAL) Antiplatelet medication not prescribed intentionally due to Allergy 0 each      lisinopril-hydrochlorothiazide (PRINZIDE/ZESTORETIC) 20-12.5 MG per tablet Take 2 tablets by mouth every morning 180 tablet 3     omeprazole (PRILOSEC) 40 MG capsule Take 1 capsule (40 mg) by mouth daily 90 capsule 3     simvastatin (ZOCOR) 40 MG tablet Take 1 tablet (40 mg) by mouth At Bedtime 90  "tablet 3     [DISCONTINUED] lisinopril-hydrochlorothiazide (PRINZIDE/ZESTORETIC) 20-12.5 MG per tablet TAKE 2 TABLETS BY MOUTH EVERY MORNING 180 tablet 0     [DISCONTINUED] simvastatin (ZOCOR) 40 MG tablet TAKE 1 TABLET (40 MG) BY MOUTH AT BEDTIME 90 tablet 0       Allergies:  -------------  Allergies   Allergen Reactions     Aspirin Hives     Nsaids Hives       Social History:  -------------------  Social History     Social History     Marital status:      Spouse name: N/A     Number of children: N/A     Years of education: N/A     Occupational History     Not on file.     Social History Main Topics     Smoking status: Never Smoker     Smokeless tobacco: Never Used     Alcohol use Yes      Comment: RARELY     Drug use: No     Sexual activity: Yes     Other Topics Concern     Not on file     Social History Narrative       Family Medical History:  ------------------------------  Family History   Problem Relation Age of Onset     Cancer Father      d age 45 renal cancer     Hypertension Mother      d  age 48 strokes     Cerebrovascular Disease Mother      Hypertension Brother      b.  1946  age 59 from cardiomyopathy     Hypertension Brother      b. 7     Hypertension Sister      b.          Review of Systems  Constitutional, HEENT, cardiovascular, pulmonary, GI, , musculoskeletal, neuro, skin, endocrine and psych systems are negative, except as otherwise noted.    OBJECTIVE:   /76  Pulse 70  Temp 98.2  F (36.8  C) (Oral)  Resp 10  Ht 5' 9\" (1.753 m)  Wt 173 lb 12.8 oz (78.8 kg)  SpO2 98%  BMI 25.67 kg/m2 Estimated body mass index is 25.67 kg/(m^2) as calculated from the following:    Height as of this encounter: 5' 9\" (1.753 m).    Weight as of this encounter: 173 lb 12.8 oz (78.8 kg).  Physical Exam    GENERAL alert and no distress.  EYES conjunctivae/corneas clear. PERRL, EOM's intact  HENT: NCAT,oral and posterior pharynx without lesions or erythema, facies symmetric  NECK: Neck " supple. No LAD, without thyroidmegaly or JVD.  RESP: Clear to ausculation bilaterally without wheezes or crackles. Normal BS in all fields.  CV: RRR normal S1S2 without  murmurs, rubs or gallops. PMI normal  LYMPH: no cervical lymph adenopathy appreciated  GI: NTND, no organomegaly, normal BS in all quadrants, without rebound or guarding  MS: No cyanosis, clubbing or edema noted bilaterally in Upper and/or Lower Extremities  SKIN: no significant ulcers, lesions or rashes on the visualized portions of the skin  NEURO: Alert and Oriented x 3, Gait normal. Reflexes normal and symmetric. Sensation grossly WNL..  PSYCH: Alert and oriented times 3; speech- coherent , normal rate and volume; able to articulate logical thoughts, able to abstract reason, no tangential thoughts, no hallucinations or delusions, affect- normal         ASSESSMENT / PLAN:     (Z00.00) Medicare annual wellness visit, subsequent  (primary encounter diagnosis)  Comment: Discussed cardiac disease risk factors and cardiac disease risk factor modification, including diabetes screening, blood pressure screening (and management if indicated), and cholesterol screening.   Reviewed immunzation guidelines, including pneumococcal vaccines, annual influenza, and shingles vaccines.   Discussed routine cancer screenings, including skin cancer, colon cancer screening for everyone until age 80, prostate cancer screening in men until age 75, mammogram and PAP/pelvic for women until age 75.   Recommended regular dentist visits to care for remaining teeth.   Recommended regular screening for vision and glaucoma.   Recommended safe driving and accident avoidance.   Plan:     (E78.5) Hyperlipidemia LDL goal <130  Comment: Discussed current lipid results, previous results (if available) current guidelines (NCEP) for treatment and goals for lipids.  Discussed lifestyle modification, dietary changes (low fat, low simple carb) and regular aerobic exercise.  Discussed the  link between dysmetabolic syndrome and impaired glucose tolerance seen in certain patterns of lipids.  Briefly discussed medication used for lipid lowering, including the statins are their possible side effects of myalgias, rhabdomyolysis, and liver toxicity.   Plan: ALT, Lipid panel reflex to direct LDL Fasting,         simvastatin (ZOCOR) 40 MG tablet, AST, **ALT         FUTURE 1yr, Lipid panel reflex to direct LDL         Fasting            (I10) Essential hypertension  Comment: /This condition is currently controlled on the current medical regimen.  Continue current therapy.  Discussed current hypertension treatment guidelines, including indications for treatment and treatment options.  Discussed the importance for aggressive management of HTN to prevent vascular complications later.  Recommended lower fat, lower carbohydrate, and lower sodium (<2000 mg)diet.  Discussed required intervals for follow up on HTN, lab studies.  Recommened pt. follow their blood pressures outside the clinic to ensure that BPs are remaining within guidelines, and to contact me if the readings are not within guidelines on a regular basis so we can adjust treatment as needed.   Plan: BASIC METABOLIC PANEL,         lisinopril-hydrochlorothiazide         (PRINZIDE/ZESTORETIC) 20-12.5 MG per tablet,         Hemoglobin, **Basic metabolic panel FUTURE 1yr,        **CBC with platelets FUTURE 1yr            (C61) Prostate cancer (H)  Comment: This condition is currently controlled on the current medical regimen.  Continue current therapy.   Plan:     (Z11.59) Need for hepatitis C screening test  Comment:   Plan: Hepatitis C Screen Reflex to HCV RNA Quant and         Genotype            (Z23) Need for prophylactic vaccination against Streptococcus pneumoniae (pneumococcus)  Comment:   Plan:      ADMIN VACCINE, ADDL [28037], Pneumococcal         vaccine 23 valent PPSV23  (Pneumovax) [82742]            (K21.9) Gastroesophageal reflux disease  "without esophagitis  Comment:   Plan: omeprazole (PRILOSEC) 40 MG capsule            (Z12.5) Special screening for malignant neoplasm of prostate  Comment:   Plan: PSA, screen, **Prostate spec antigen screen         FUTURE 1yr               End of Life Planning:  Patient currently has an advanced directive: No.  I have verified the patient's ablity to prepare an advanced directive/make health care decisions.  Literature was provided to assist patient in preparing an advanced directive.    COUNSELING:  Reviewed preventive health counseling, as reflected in patient instructions       Regular exercise       Healthy diet/nutrition       Vision screening       Hearing screening       Dental care       Immunizations    Vaccinated for: Pneumococcal             Hepatitis C screening       Colon cancer screening       Prostate cancer screening    BP Readings from Last 1 Encounters:   07/09/18 136/76     Estimated body mass index is 25.67 kg/(m^2) as calculated from the following:    Height as of this encounter: 5' 9\" (1.753 m).    Weight as of this encounter: 173 lb 12.8 oz (78.8 kg).           reports that he has never smoked. He has never used smokeless tobacco.      Appropriate preventive services were discussed with this patient, including applicable screening as appropriate for cardiovascular disease, diabetes, osteopenia/osteoporosis, and glaucoma.  As appropriate for age/gender, discussed screening for colorectal cancer, prostate cancer, breast cancer, and cervical cancer. Checklist reviewing preventive services available has been given to the patient.    Reviewed patients plan of care and provided an AVS. The Basic Care Plan (routine screening as documented in Health Maintenance) for Willis meets the Care Plan requirement. This Care Plan has been established and reviewed with the Patient.    Counseling Resources:  ATP IV Guidelines  Pooled Cohorts Equation Calculator  Breast Cancer Risk Calculator  FRAX Risk " Assessment  ICSI Preventive Guidelines  Dietary Guidelines for Americans, 2010  USDA's MyPlate  ASA Prophylaxis  Lung CA Screening    Quintin Espinoza MD  [facility]  Answers for HPI/ROS submitted by the patient on 7/9/2018   PHQ-2 Score: 0

## 2018-07-09 NOTE — MR AVS SNAPSHOT
"              After Visit Summary   7/9/2018    Willis De La Cruz    MRN: [id number]           Patient Information     Date Of Birth          1950        Visit Information        Provider Department      7/9/2018 7:20 AM Quintin Espinoza MD [facility]        Today's Diagnoses     Medicare annual wellness visit, subsequent    -  1    Hyperlipidemia LDL goal <130        Essential hypertension        Prostate cancer (H)        Need for hepatitis C screening test        Need for prophylactic vaccination against Streptococcus pneumoniae (pneumococcus)        Gastroesophageal reflux disease without esophagitis        Special screening for malignant neoplasm of prostate          Care Instructions    *  Continue all medications at the same doses.  Contact your usual pharmacy if you need refills.     *  Pneumonia vaccine given today    *  COlonoscopy next due 2022.     *  Return to see me in 1 year, sooner if needed.  Please get fasting labs done at the [facility] or any other [facility] lab 1-2 days before this appointment.  If you get the labs done at another [facility], make arrangements with them directly.  The orders will be in place.  Eat nothing for at least 8 hours prior to having these labs drawn.  Call [phone] to schedule appointments at [facility].       5 GOALS TO PREVENT VASCULAR DISEASE:     1.  Aggressive blood pressure control, under 130/80 ideally.  Using medications if needed.    Your blood pressure is under good control    BP Readings from Last 4 Encounters:   07/09/18 142/70   05/04/17 138/72   03/17/16 126/66   12/01/14 132/74       2.  Aggressive LDL cholesterol (\"bad cholesterol\") lowering as indicated.    Your goal is an LDL under 130 for sure, preferably under 100.  (If you have diabetes or previous vascular disease, the the LDL goals would be under 100 for sure, preferably under 70.)    New guidelines identify four high-risk " "groups who could benefit from statins:   *people with pre-existing heart disease, such as those who have had a heart attack;   *people ages 40 to 75 who have diabetes of any type  *patients ages 40 to 75 with at least a 7.5% risk of developing cardiovascular disease over the next decade, according to a formula described in the guidelines  *patients with the sort of super-high cholesterol that sometimes runs in families, as evidenced by an LDL of 190 milligrams per deciliter or higher    Your cholesterol levels are well controlled.    Recent Labs   Lab Test  05/04/17   0932  03/16/16   0837  11/24/14   0813  08/13/13   0927   CHOL  186  168  170  181   HDL  38*  34*  40*  49   LDL  83  79  68  95   TRIG  323*  275*  308*  182*   CHOLHDLRATIO   --    --   4.2  3.7       3.  Aggressive diabetic prevention, screening and/or management.      You do not have diabetes as of the most recent blood tests.     4.  No smoking    5.  Consider taking low dose aspirin (81 mg) tablet once per day over the age of 50, every day unless there is a specific reason that you cannot take aspirin (such as side effect, allergy, or you are on another \"blood thinner\").        --Based on your current cardiac risk factors, you should take Aspirin 81 mg once per day if you are over 50 years of age.         SHINGLES VACCINE:     I would recommend that you consider getting a \"shingles vaccine\".  The shingles vaccine does not stop you from getting shingles, but it decreases the intensity of the event, the duration of the event, and decreases the painful nerve condition that results     There are two options available:     --Shingrix (available starting early 2018), 2 shots, 2-6 months apart  **recommended**   OR   --Zostavax, one shot    --Based on the available data, the Shingrix vaccine has superior benefit and should be considered even if you have had the Zostavax vaccine before.      --Contact your insurance provider and ask them if either " "shingles vaccine is covered and is so, how much it will cost you.  Usually this will be cheaper to get in a pharmacy given by the pharmacist.    --Regardless of the coverage, I would recommend that you consider the vaccine since shingles is very painful, just ask anyone who has ever had it.                 Preventive Health Recommendations:       Male Ages 65 and over    Yearly exam:             See your health care provider every year in order to  o   Review health changes.   o   Discuss preventive care.    o   Review your medicines if your doctor has prescribed any.    Talk with your health care provider about whether you should have a test to screen for prostate cancer (PSA).    Every 3 years, have a diabetes test (fasting glucose). If you are at risk for diabetes, you should have this test more often.    Every 5 years, have a cholesterol test. Have this test more often if you are at risk for high cholesterol or heart disease.     Every 10 years, have a colonoscopy. Or, have a yearly FIT test (stool test). These exams will check for colon cancer.    Talk to with your health care provider about screening for Abdominal Aortic Aneurysm if you have a family history of AAA or have a history of smoking.  Shots:     Get a flu shot each year.     Get a tetanus shot every 10 years.     Talk to your doctor about your pneumonia vaccines. There are now two you should receive - Pneumovax (PPSV 23) and Prevnar (PCV 13).    Talk to your pharmacist about a shingles vaccine.     Talk to your doctor about the hepatitis B vaccine.     --Good Grains:  Oats, brown rice, Quinoa (these do not raise the blood sugar as much)     --Bad grains:  Anything made from wheat or white rice     (because these raise the blood sugars significantly, and the possible gluten issue from wheat for some people).      --Proteins:  Aim for \"lean proteins\" including chicken, fish, seafood, pork, turkey, and eggs (in moderation); Eat red meat only " occasionally      Nutrition:     Eat at least 5 servings of fruits and vegetables each day.     Eat whole-grain bread, whole-wheat pasta and brown rice instead of white grains and rice.     Get adequate Calcium and Vitamin D.   Lifestyle    Exercise for at least 150 minutes a week (30 minutes a day, 5 days a week). This will help you control your weight and prevent disease.     Limit alcohol to one drink per day.     No smoking.     Wear sunscreen to prevent skin cancer.     See your dentist every six months for an exam and cleaning.     See your eye doctor every 1 to 2 years to screen for conditions such as glaucoma, macular degeneration and cataracts.          Follow-ups after your visit        Future tests that were ordered for you today     Open Future Orders        Priority Expected Expires Ordered    **ALT FUTURE 1yr Routine 6/9/2019 7/9/2019 7/9/2018    **Basic metabolic panel FUTURE 1yr Routine 6/9/2019 7/9/2019 7/9/2018    **CBC with platelets FUTURE 1yr Routine 6/9/2019 7/9/2019 7/9/2018    Lipid panel reflex to direct LDL Fasting Routine 6/9/2019 7/9/2019 7/9/2018    **Prostate spec antigen screen FUTURE 1yr Routine 6/9/2019 7/9/2019 7/9/2018            Who to contact     If you have questions or need follow up information about today's clinic visit or your schedule please contact [facility] directly at [phone].  Normal or non-critical lab and imaging results will be communicated to you by MyChart, letter or phone within 4 business days after the clinic has received the results. If you do not hear from us within 7 days, please contact the clinic through MyChart or phone. If you have a critical or abnormal lab result, we will notify you by phone as soon as possible.  Submit refill requests through [employer] or call your pharmacy and they will forward the refill request to us. Please allow 3 business days for your refill to be completed.          Additional Information About Your  "Visit        MyChart Information     [employer] gives you secure access to your electronic health record. If you see a primary care provider, you can also send messages to your care team and make appointments. If you have questions, please call your primary care clinic.  If you do not have a primary care provider, please call [phone] and they will assist you.        Care EveryWhere ID     This is your Care EveryWhere ID. This could be used by other organizations to access your [facility] medical records  [id number]        Your Vitals Were     Pulse Temperature Respirations Height Pulse Oximetry BMI (Body Mass Index)    70 98.2  F (36.8  C) (Oral) 10 5' 9\" (1.753 m) 98% 25.67 kg/m2       Blood Pressure from Last 3 Encounters:   07/09/18 136/76   05/04/17 138/72   03/17/16 126/66    Weight from Last 3 Encounters:   07/09/18 173 lb 12.8 oz (78.8 kg)   05/04/17 169 lb 1.6 oz (76.7 kg)   03/17/16 165 lb 8 oz (75.1 kg)              We Performed the Following          ADMIN VACCINE, ADDL [46333]     ALT     AST     BASIC METABOLIC PANEL     Hemoglobin     Hepatitis C Screen Reflex to HCV RNA Quant and Genotype     Lipid panel reflex to direct LDL Fasting     Pneumococcal vaccine 23 valent PPSV23  (Pneumovax) [28895]     PSA, screen          Where to get your medicines      These medications were sent to [facility] [address]  [address] [place] [address]     Phone:  [phone]     lisinopril-hydrochlorothiazide 20-12.5 MG per tablet    omeprazole 40 MG capsule    simvastatin 40 MG tablet          Primary Care Provider Office Phone # Fax #    Quintin Espinoza [phone]       [address] [place] [address]        Equal Access to Services     MARGARET ALEX AH: Kaur Gutierrez, washarlada tatianna, qaybta kaalmada rashard byrne. So [facility] [phone].    ATENCIÓN: Si habla español, tiene a yao disposición " servicios gratuitos de asistencia lingüística. [employer] song [phone].    We comply with applicable federal civil rights laws and [place] laws. We do not discriminate on the basis of race, color, national origin, age, disability, sex, sexual orientation, or gender identity.            Thank you!     Thank you for choosing [facility]  for your care. Our goal is always to provide you with excellent care. Hearing back from our patients is one way we can continue to improve our services. Please take a few minutes to complete the written survey that you may receive in the mail after your visit with us. Thank you!             Your Updated Medication List - Protect others around you: Learn how to safely use, store and throw away your medicines at www.disposemymeds.org.          This list is accurate as of 7/9/18  8:02 AM.  Always use your most recent med list.                   Brand Name Dispense Instructions for use Diagnosis    ASPIRIN NOT PRESCRIBED    INTENTIONAL    0 each    Antiplatelet medication not prescribed intentionally due to Allergy    Essential hypertension       lisinopril-hydrochlorothiazide 20-12.5 MG per tablet    PRINZIDE/ZESTORETIC    180 tablet    Take 2 tablets by mouth every morning    Essential hypertension       omeprazole 40 MG capsule    priLOSEC    90 capsule    Take 1 capsule (40 mg) by mouth daily    Gastroesophageal reflux disease without esophagitis       simvastatin 40 MG tablet    ZOCOR    90 tablet    Take 1 tablet (40 mg) by mouth At Bedtime    Hyperlipidemia LDL goal <130

## 2019-07-09 DIAGNOSIS — E78.5 HYPERLIPIDEMIA LDL GOAL <130: ICD-10-CM

## 2019-07-09 RX ORDER — SIMVASTATIN 40 MG
40 TABLET ORAL AT BEDTIME
Qty: 30 TABLET | Refills: 0 | Status: SHIPPED | OUTPATIENT
Start: 2019-07-09 | End: 2019-07-31

## 2019-07-09 NOTE — LETTER
[facility]  [address] [address]  [phone]            Willis AMADO [facility]  [address] [place] [address]        July 9, 2019    Dear Willis,    While refilling your prescription today, we noticed that you are due for an appointment with your provider.  We will refill your prescription for 30 days, but a follow-up appointment must be made before any additional refills can be approved.     Taking care of your health is important to us and we look forward to seeing you in the near future.  Please call us at [phone] or [phone] (or use [employer]) to schedule an appointment.     Please disregard this notice if you have already made an appointment.    Sincerely,        [facility]

## 2019-07-09 NOTE — TELEPHONE ENCOUNTER
"Requested Prescriptions   Pending Prescriptions Disp Refills     simvastatin (ZOCOR) 40 MG tablet [Pharmacy Med Name: SIMVASTATIN 40MG TAB 40 TAB] 90 tablet 3     Sig: TAKE 1 TABLET (40 MG) BY MOUTH AT BEDTIME       Statins Protocol Failed - 7/9/2019  9:09 AM        Failed - Recent (12 mo) or future (30 days) visit within the authorizing provider's specialty     Patient had office visit in the last 12 months or has a visit in the next 30 days with authorizing provider or within the authorizing provider's specialty.  See \"Patient Info\" tab in inbasket, or \"Choose Columns\" in Meds & Orders section of the refill encounter.              Passed - LDL on file in past 12 months     Recent Labs   Lab Test 07/09/18  0809   LDL 91             Passed - No abnormal creatine kinase in past 12 months     No lab results found.             Passed - Medication is active on med list        Passed - Patient is age 18 or older        Medication is being filled for 1 time refill only due to:  Patient needs to be seen because it has been more than one year since last visit.    "

## 2019-07-17 ENCOUNTER — OFFICE VISIT (OUTPATIENT)
Dept: INTERNAL MEDICINE | Facility: CLINIC | Age: 69
End: 2019-07-17
Payer: COMMERCIAL

## 2019-07-17 ENCOUNTER — NURSE TRIAGE (OUTPATIENT)
Dept: INTERNAL MEDICINE | Facility: CLINIC | Age: 69
End: 2019-07-17

## 2019-07-17 VITALS
HEART RATE: 78 BPM | BODY MASS INDEX: 24.17 KG/M2 | OXYGEN SATURATION: 97 % | RESPIRATION RATE: 16 BRPM | DIASTOLIC BLOOD PRESSURE: 64 MMHG | WEIGHT: 163.7 LBS | TEMPERATURE: 97.9 F | SYSTOLIC BLOOD PRESSURE: 118 MMHG

## 2019-07-17 DIAGNOSIS — R19.7 DIARRHEA, UNSPECIFIED TYPE: Primary | ICD-10-CM

## 2019-07-17 DIAGNOSIS — N28.9 RENAL INSUFFICIENCY: Primary | ICD-10-CM

## 2019-07-17 DIAGNOSIS — E78.00 PURE HYPERCHOLESTEROLEMIA: ICD-10-CM

## 2019-07-17 LAB
ALBUMIN SERPL-MCNC: 4.2 G/DL (ref 3.4–5)
ALP SERPL-CCNC: 90 U/L (ref 40–150)
ALT SERPL W P-5'-P-CCNC: 26 U/L (ref 0–70)
ANION GAP SERPL CALCULATED.3IONS-SCNC: 11 MMOL/L (ref 3–14)
AST SERPL W P-5'-P-CCNC: 16 U/L (ref 0–45)
BASOPHILS # BLD AUTO: 0.1 10E9/L (ref 0–0.2)
BASOPHILS NFR BLD AUTO: 0.4 %
BILIRUB SERPL-MCNC: 0.5 MG/DL (ref 0.2–1.3)
BUN SERPL-MCNC: 34 MG/DL (ref 7–30)
CALCIUM SERPL-MCNC: 9.2 MG/DL (ref 8.5–10.1)
CHLORIDE SERPL-SCNC: 102 MMOL/L (ref 94–109)
CHOLEST SERPL-MCNC: 210 MG/DL
CO2 SERPL-SCNC: 23 MMOL/L (ref 20–32)
CREAT SERPL-MCNC: 1.38 MG/DL (ref 0.66–1.25)
DIFFERENTIAL METHOD BLD: ABNORMAL
EOSINOPHIL # BLD AUTO: 0.3 10E9/L (ref 0–0.7)
EOSINOPHIL NFR BLD AUTO: 1.7 %
ERYTHROCYTE [DISTWIDTH] IN BLOOD BY AUTOMATED COUNT: 13.7 % (ref 10–15)
GFR SERPL CREATININE-BSD FRML MDRD: 51 ML/MIN/{1.73_M2}
GLUCOSE SERPL-MCNC: 114 MG/DL (ref 70–99)
HCT VFR BLD AUTO: 48.1 % (ref 40–53)
HDLC SERPL-MCNC: 40 MG/DL
HGB BLD-MCNC: 16.7 G/DL (ref 13.3–17.7)
LDLC SERPL CALC-MCNC: 91 MG/DL
LYMPHOCYTES # BLD AUTO: 2 10E9/L (ref 0.8–5.3)
LYMPHOCYTES NFR BLD AUTO: 14.2 %
MCH RBC QN AUTO: 30 PG (ref 26.5–33)
MCHC RBC AUTO-ENTMCNC: 34.7 G/DL (ref 31.5–36.5)
MCV RBC AUTO: 87 FL (ref 78–100)
MONOCYTES # BLD AUTO: 1.3 10E9/L (ref 0–1.3)
MONOCYTES NFR BLD AUTO: 8.9 %
NEUTROPHILS # BLD AUTO: 10.7 10E9/L (ref 1.6–8.3)
NEUTROPHILS NFR BLD AUTO: 74.8 %
NONHDLC SERPL-MCNC: 170 MG/DL
PLATELET # BLD AUTO: 278 10E9/L (ref 150–450)
POTASSIUM SERPL-SCNC: 3.6 MMOL/L (ref 3.4–5.3)
PROT SERPL-MCNC: 8 G/DL (ref 6.8–8.8)
RBC # BLD AUTO: 5.56 10E12/L (ref 4.4–5.9)
SODIUM SERPL-SCNC: 136 MMOL/L (ref 133–144)
TRIGL SERPL-MCNC: 397 MG/DL
WBC # BLD AUTO: 14.3 10E9/L (ref 4–11)

## 2019-07-17 PROCEDURE — 80053 COMPREHEN METABOLIC PANEL: CPT | Performed by: INTERNAL MEDICINE

## 2019-07-17 PROCEDURE — 36415 COLL VENOUS BLD VENIPUNCTURE: CPT | Performed by: INTERNAL MEDICINE

## 2019-07-17 PROCEDURE — 99214 OFFICE O/P EST MOD 30 MIN: CPT | Performed by: INTERNAL MEDICINE

## 2019-07-17 PROCEDURE — 80061 LIPID PANEL: CPT | Performed by: INTERNAL MEDICINE

## 2019-07-17 PROCEDURE — 85025 COMPLETE CBC W/AUTO DIFF WBC: CPT | Performed by: INTERNAL MEDICINE

## 2019-07-17 NOTE — PROGRESS NOTES
SUBJECTIVE:                                                      HPI: Willis De La Cruz is a pleasant 69 year old male who presents with diarrhea:    Accompanied by wife.    - started a couple days ago  - started several hours after eating a very large amount of ice cream, mixed with milk   - other people consumed same items around the same time - they did not fall ill  - woke up in the middle of the night to abdominal cramping and rectal urgency  - 4 episodes of diarrhea yesterday; several already today, all with preceding abdominal cramping/rectal urgency  - associated nausea, bloating, and anorexia; no vomiting; able to maintain PO without difficulty  - associated fatigue    - no melena or hematochezia  - no fevers or chills    - no recent travel or swimming in pools/lakes  - no sick contacts    Last colonoscopy was in 2012 - normal - repeat in 10 years.     Likely unrelated to above, patient developed a pruritic papular/pustular rash over mostly his lower extremities last week. This is improving over time.    Also, likely unrelated to above, patient was recently struggling with overly chapped lips. Had been applying and OTC cream (unknown type) with improvement in symptoms.     Finally, unrelated to above, patient is DUE for his fasting lipid panel.     The medication, allergy, and problem lists have been reviewed and updated as appropriate.     OBJECTIVE:                                                      /64   Pulse 78   Temp 97.9  F (36.6  C) (Oral)   Resp 16   Wt 74.3 kg (163 lb 11.2 oz)   SpO2 97%   BMI 24.17 kg/m    Constitutional: well-appearing  Respiratory: normal respiratory effort; clear to auscultation bilaterally  Cardiovascular: regular rate and rhythm; no edema  Gastrointestinal: soft, non-tender, non-distended, and bowel sounds present; no organomegaly or masses   Musculoskeletal: normal gait and station  Psych: normal judgment and insight; normal mood and affect; recent and remote  memory intact    ASSESSMENT/PLAN:                                                      (R19.7) Diarrhea, unspecified type  Comment: suspect viral gastroenteritis; lactose intolerance is also a possibility considering the admittedly large amount of dairy he consumed prior to symptom onset.   Plan: CBC, CMP, and stool studies today; recommendations to follow.    (E78.00) Pure hypercholesterolemia  Plan: fasting lipid profile today.     The instructions on the AVS were discussed and explained to the patient. Patient expressed understanding of instructions.    (Chart documentation was completed, in part, with [employer] voice-recognition software. Even though reviewed, some grammatical, spelling, and word errors may remain.)    Kim Durbin MD   [address] [address]  T: [phone], F: [phone]

## 2019-07-17 NOTE — TELEPHONE ENCOUNTER
"    Additional Information    Negative: Shock suspected (e.g., cold/pale/clammy skin, too weak to stand, low BP, rapid pulse)    Negative: Difficult to awaken or acting confused (e.g., disoriented, slurred speech)    Negative: Sounds like a life-threatening emergency to the triager    Negative: Vomiting also present and worse than the diarrhea    Negative: Blood in stool and without diarrhea    Negative: SEVERE abdominal pain (e.g., excruciating) and present > 1 hour    Negative: SEVERE abdominal pain and age > 60    Negative: Bloody, black, or tarry bowel movements    Negative: SEVERE diarrhea (e.g., 7 or more times / day more than normal) and age > 60 years    Negative: Constant abdominal pain lasting > 2 hours    Negative: Drinking very little and has signs of dehydration (e.g., no urine > 12 hours, very dry mouth, very lightheaded)    MODERATE diarrhea (e.g., 4-6 times / day more than normal) and present > 48 hours (2 days)    Negative: SEVERE diarrhea (e.g., 7 or more times / day more than normal) and present > 24 hours (1 day)    Negative: Abdominal pain  (Exception: pain clears completely with each passage of diarrhea stool)    MODERATE diarrhea (e.g., 4-6 times / day more than normal) and age > 70 years    Negative: Fever > 101 F (38.3 C)    Negative: Blood in the stool    Negative: Mucus or pus in stool has been present > 2 days and diarrhea is more than mild    Negative: Weak immune system (e.g., HIV positive, cancer chemo, splenectomy, organ transplant, chronic steroids)    Answer Assessment - Initial Assessment Questions  1. DIARRHEA SEVERITY: \"How bad is the diarrhea?\" \"How many extra stools have you had in the past 24 hours than normal?\"     - MILD: Few loose or mushy BMs; increase of 1-3 stools over normal daily number of stools; mild increase in ostomy output.    - MODERATE: Increase of 4-6 stools daily over normal; moderate increase in ostomy output.    - SEVERE (or Worst Possible): Increase of 7 or " "more stools daily over normal; moderate increase in ostomy output; incontinence.      Moderate,   2. ONSET: \"When did the diarrhea begin?\"       Yesterday, night before   3. BM CONSISTENCY: \"How loose or watery is the diarrhea?\"       Watery  4. VOMITING: \"Are you also vomiting?\" If so, ask: \"How many times in the past 24 hours?\"       No  5. ABDOMINAL PAIN: \"Are you having any abdominal pain?\" If yes: \"What does it feel like?\" (e.g., crampy, dull, intermittent, constant)       Denies any cramping  6. ABDOMINAL PAIN SEVERITY: If present, ask: \"How bad is the pain?\"  (e.g., Scale 1-10; mild, moderate, or severe)     - MILD (1-3): doesn't interfere with normal activities, abdomen soft and not tender to touch      - MODERATE (4-7): interferes with normal activities or awakens from sleep, tender to touch      - SEVERE (8-10): excruciating pain, doubled over, unable to do any normal activities        mild  7. ORAL INTAKE: If vomiting, \"Have you been able to drink liquids?\" \"How much fluids have you had in the past 24 hours?\"      Fluid, No vomiting.   8. HYDRATION: \"Any signs of dehydration?\" (e.g., dry mouth [not just dry lips], too weak to stand, dizziness, new weight loss) \"When did you last urinate?\"      Weakness, No concerns of dry mouth as long as he is drinking water  9. EXPOSURE: \"Have you traveled to a foreign country recently?\" \"Have you been exposed to anyone with diarrhea?\" \"Could you have eaten any food that was spoiled?\"      No or not that he is aware of  10. ANTIBIOTIC USE: \"Are you taking antibiotics now or have you taken antibiotics in the past 2 months?\"        None recently  11. OTHER SYMPTOMS: \"Do you have any other symptoms?\" (e.g., fever, blood in stool)        Belching, nausea  12. PREGNANCY: \"Is there any chance you are pregnant?\" \"When was your last menstrual period?\"        No    Protocols used: DIARRHEA-A-OH      "

## 2019-07-19 ENCOUNTER — TELEPHONE (OUTPATIENT)
Dept: INTERNAL MEDICINE | Facility: CLINIC | Age: 69
End: 2019-07-19

## 2019-07-19 NOTE — TELEPHONE ENCOUNTER
LOV 7/17/19.  Unable to give stool sample.  Symptoms have cleared.  Does Dr. Durbin still need the sample?  Pls advise.    Phone on file.

## 2019-07-31 ENCOUNTER — OFFICE VISIT (OUTPATIENT)
Dept: INTERNAL MEDICINE | Facility: CLINIC | Age: 69
End: 2019-07-31
Payer: COMMERCIAL

## 2019-07-31 VITALS
OXYGEN SATURATION: 98 % | SYSTOLIC BLOOD PRESSURE: 114 MMHG | HEART RATE: 74 BPM | RESPIRATION RATE: 18 BRPM | DIASTOLIC BLOOD PRESSURE: 68 MMHG | WEIGHT: 170.2 LBS | BODY MASS INDEX: 25.13 KG/M2

## 2019-07-31 DIAGNOSIS — N28.9 RENAL INSUFFICIENCY: ICD-10-CM

## 2019-07-31 DIAGNOSIS — E78.00 PURE HYPERCHOLESTEROLEMIA: ICD-10-CM

## 2019-07-31 DIAGNOSIS — R23.8 SKIN IRRITATION: ICD-10-CM

## 2019-07-31 DIAGNOSIS — I10 BENIGN ESSENTIAL HYPERTENSION: Primary | ICD-10-CM

## 2019-07-31 DIAGNOSIS — K21.9 GASTROESOPHAGEAL REFLUX DISEASE, ESOPHAGITIS PRESENCE NOT SPECIFIED: ICD-10-CM

## 2019-07-31 PROCEDURE — 99214 OFFICE O/P EST MOD 30 MIN: CPT | Performed by: INTERNAL MEDICINE

## 2019-07-31 RX ORDER — TRIAMCINOLONE ACETONIDE 1 MG/G
CREAM TOPICAL 2 TIMES DAILY
Qty: 15 G | Refills: 1 | Status: SHIPPED | OUTPATIENT
Start: 2019-07-31 | End: 2020-09-21

## 2019-07-31 RX ORDER — LISINOPRIL 40 MG/1
40 TABLET ORAL DAILY
Qty: 90 TABLET | Refills: 3 | Status: SHIPPED | OUTPATIENT
Start: 2019-07-31 | End: 2020-08-05

## 2019-07-31 RX ORDER — SIMVASTATIN 40 MG
40 TABLET ORAL AT BEDTIME
Qty: 90 TABLET | Refills: 3 | Status: SHIPPED | OUTPATIENT
Start: 2019-07-31 | End: 2020-09-21

## 2019-07-31 RX ORDER — OMEPRAZOLE 40 MG/1
40 CAPSULE, DELAYED RELEASE ORAL DAILY
Qty: 90 CAPSULE | Refills: 3 | Status: SHIPPED | OUTPATIENT
Start: 2019-07-31 | End: 2020-09-21

## 2019-07-31 RX ORDER — LISINOPRIL AND HYDROCHLOROTHIAZIDE 12.5; 2 MG/1; MG/1
2 TABLET ORAL EVERY MORNING
Qty: 180 TABLET | Refills: 3 | Status: CANCELLED | OUTPATIENT
Start: 2019-07-31

## 2019-07-31 NOTE — PATIENT INSTRUCTIONS
Refills of Zocor and Prilosec sent in.    STOP lisinopril-hydrochlorothiazide.    START lisinopril 40mg ONCE daily (in the morning or night - just be consistent with timing).    Follow-up in ~1 month for blood pressure recheck and non-fasting labs.

## 2019-07-31 NOTE — PROGRESS NOTES
SUBJECTIVE:                                                      HPI: Willis De La Cruz is a pleasant 69 year old male who presents for medication refills:    Acid reflux is well-controlled on omeprazole 40mg daily.    Cholesterol is well-controlled on Zocor 40mg daily.     Blood pressure is well-controlled on lisinopril-hydrochlorothiazide 20-12.5, two tabs daily.    Recent labs significant for worsening kidney function. BUN/Cr ratio suggests dehydration - not unexpected in setting of fasting labs. Lisinopril and/or hydrochlorothiazide may also be contributing.     Patient also complains of skin irritation below is lower lip and above his beard. Ongoing chronically. Admits to frequent licking due to irritation.     The medication, allergy, and problem lists have been reviewed and updated as appropriate.     OBJECTIVE:                                                      /68   Pulse 74   Resp 18   Wt 77.2 kg (170 lb 3.2 oz)   SpO2 98%   BMI 25.13 kg/m    Constitutional: well-appearing  Integumentary: mild erythema with fine overlying scale below lower vermilion border and above beard line    ASSESSMENT/PLAN:                                                      (I10) Benign essential hypertension  (primary encounter diagnosis)  (N28.9) Renal insufficiency  Comment: very well controlled on lisinopril 20-12.5mg, two tabs taken together.   Plan:    - STOP lisinopril-hydrochlorothiazide.   - START lisinopril 40mg daily (minus hydrochlorothiazide component).   - follow-up in ~2-4 weeks for blood pressure recheck and repeat BMP (patient told not to fast).    (E78.00) Pure hypercholesterolemia  Comment: well-controlled on Zocor.  Plan: CPM; refills provided.     (K21.9) Gastroesophageal reflux disease, esophagitis presence not specified  Comment: well-controlled on omeprazole.   Plan: CPM; refills provided.     (R23.8) Skin irritation  Plan:    - TRIAL of triamcinolone cream twice a day for no more than 2 weeks  at a time.   - follow-up in 2-4 weeks.    - if rash worsens or changes in the interim, patient to contact MD.     The instructions on the AVS were discussed and explained to the patient. Patient expressed understanding of instructions.    (Chart documentation was completed, in part, with [employer] voice-recognition software. Even though reviewed, some grammatical, spelling, and word errors may remain.)    Kim Durbin MD   [address] [address]  T: [phone], F: [phone]

## 2019-12-15 ENCOUNTER — HEALTH MAINTENANCE LETTER (OUTPATIENT)
Age: 69
End: 2019-12-15

## 2020-08-05 DIAGNOSIS — I10 BENIGN ESSENTIAL HYPERTENSION: ICD-10-CM

## 2020-08-05 RX ORDER — LISINOPRIL 40 MG/1
40 TABLET ORAL DAILY
Qty: 90 TABLET | Refills: 0 | Status: SHIPPED | OUTPATIENT
Start: 2020-08-05 | End: 2020-09-21

## 2020-08-05 NOTE — TELEPHONE ENCOUNTER
Routing refill request to provider for review/approval because:   Blood pressure under 140/90 in past 12 months Protocol Details    Recent (12 mo) or future (30 days) visit within the authorizing provider's specialty     Normal serum creatinine on file in past 12 months     Normal serum potassium on file in past 12 months

## 2020-09-17 DIAGNOSIS — I10 ESSENTIAL HYPERTENSION: ICD-10-CM

## 2020-09-17 DIAGNOSIS — Z12.5 SPECIAL SCREENING FOR MALIGNANT NEOPLASM OF PROSTATE: ICD-10-CM

## 2020-09-17 DIAGNOSIS — E78.5 HYPERLIPIDEMIA LDL GOAL <130: ICD-10-CM

## 2020-09-17 LAB
ANION GAP SERPL CALCULATED.3IONS-SCNC: 4 MMOL/L (ref 3–14)
BUN SERPL-MCNC: 14 MG/DL (ref 7–30)
CALCIUM SERPL-MCNC: 9.3 MG/DL (ref 8.5–10.1)
CHLORIDE SERPL-SCNC: 106 MMOL/L (ref 94–109)
CHOLEST SERPL-MCNC: 183 MG/DL
CO2 SERPL-SCNC: 27 MMOL/L (ref 20–32)
CREAT SERPL-MCNC: 1.06 MG/DL (ref 0.66–1.25)
ERYTHROCYTE [DISTWIDTH] IN BLOOD BY AUTOMATED COUNT: 13.2 % (ref 10–15)
GFR SERPL CREATININE-BSD FRML MDRD: 70 ML/MIN/{1.73_M2}
GLUCOSE SERPL-MCNC: 100 MG/DL (ref 70–99)
HCT VFR BLD AUTO: 45.3 % (ref 40–53)
HDLC SERPL-MCNC: 45 MG/DL
HGB BLD-MCNC: 15.2 G/DL (ref 13.3–17.7)
LDLC SERPL CALC-MCNC: 90 MG/DL
MCH RBC QN AUTO: 29.3 PG (ref 26.5–33)
MCHC RBC AUTO-ENTMCNC: 33.6 G/DL (ref 31.5–36.5)
MCV RBC AUTO: 88 FL (ref 78–100)
NONHDLC SERPL-MCNC: 138 MG/DL
PLATELET # BLD AUTO: 248 10E9/L (ref 150–450)
POTASSIUM SERPL-SCNC: 4.6 MMOL/L (ref 3.4–5.3)
PSA SERPL-ACNC: <0.01 UG/L (ref 0–4)
RBC # BLD AUTO: 5.18 10E12/L (ref 4.4–5.9)
SODIUM SERPL-SCNC: 137 MMOL/L (ref 133–144)
TRIGL SERPL-MCNC: 239 MG/DL
WBC # BLD AUTO: 6.9 10E9/L (ref 4–11)

## 2020-09-17 PROCEDURE — 80048 BASIC METABOLIC PNL TOTAL CA: CPT | Performed by: INTERNAL MEDICINE

## 2020-09-17 PROCEDURE — 85027 COMPLETE CBC AUTOMATED: CPT | Performed by: INTERNAL MEDICINE

## 2020-09-17 PROCEDURE — 36415 COLL VENOUS BLD VENIPUNCTURE: CPT | Performed by: INTERNAL MEDICINE

## 2020-09-17 PROCEDURE — 80061 LIPID PANEL: CPT | Performed by: INTERNAL MEDICINE

## 2020-09-17 PROCEDURE — G0103 PSA SCREENING: HCPCS | Performed by: INTERNAL MEDICINE

## 2020-09-21 ENCOUNTER — OFFICE VISIT (OUTPATIENT)
Dept: INTERNAL MEDICINE | Facility: CLINIC | Age: 70
End: 2020-09-21
Payer: COMMERCIAL

## 2020-09-21 VITALS
RESPIRATION RATE: 16 BRPM | SYSTOLIC BLOOD PRESSURE: 148 MMHG | BODY MASS INDEX: 24.74 KG/M2 | OXYGEN SATURATION: 98 % | DIASTOLIC BLOOD PRESSURE: 90 MMHG | WEIGHT: 167.5 LBS | TEMPERATURE: 97.3 F | HEART RATE: 70 BPM

## 2020-09-21 DIAGNOSIS — I10 BENIGN ESSENTIAL HYPERTENSION: ICD-10-CM

## 2020-09-21 DIAGNOSIS — R23.8 SKIN IRRITATION: ICD-10-CM

## 2020-09-21 DIAGNOSIS — K21.9 GASTROESOPHAGEAL REFLUX DISEASE, ESOPHAGITIS PRESENCE NOT SPECIFIED: ICD-10-CM

## 2020-09-21 DIAGNOSIS — E78.00 PURE HYPERCHOLESTEROLEMIA: ICD-10-CM

## 2020-09-21 PROCEDURE — 99214 OFFICE O/P EST MOD 30 MIN: CPT | Performed by: INTERNAL MEDICINE

## 2020-09-21 RX ORDER — OMEPRAZOLE 40 MG/1
40 CAPSULE, DELAYED RELEASE ORAL DAILY
Qty: 90 CAPSULE | Refills: 3 | Status: SHIPPED | OUTPATIENT
Start: 2020-09-21 | End: 2021-09-28

## 2020-09-21 RX ORDER — SIMVASTATIN 40 MG
40 TABLET ORAL AT BEDTIME
Qty: 90 TABLET | Refills: 3 | Status: SHIPPED | OUTPATIENT
Start: 2020-09-21 | End: 2021-10-01

## 2020-09-21 RX ORDER — TRIAMCINOLONE ACETONIDE 1 MG/G
CREAM TOPICAL 2 TIMES DAILY
Qty: 15 G | Refills: 1 | Status: SHIPPED | OUTPATIENT
Start: 2020-09-21 | End: 2021-11-18

## 2020-09-21 RX ORDER — LISINOPRIL 40 MG/1
40 TABLET ORAL DAILY
Qty: 90 TABLET | Refills: 3 | Status: SHIPPED | OUTPATIENT
Start: 2020-09-21 | End: 2021-09-28

## 2020-09-21 NOTE — PATIENT INSTRUCTIONS
Our goal blood pressure is <140/90 (both numbers!).    If it remains elevated at home, recommend adding a second blood pressure medication. (can send me a [employer] message or call).    ---    Flu shot today.    ---    Refills sent in.

## 2020-09-21 NOTE — PROGRESS NOTES
SUBJECTIVE                                                      HPI: Willis De La Cruz is a very pleasant 70 year old male who presents for medication follow-up:    Patient is on lisinopril 40 mg daily for high blood pressure. Tolerating well - no adverse side effects. Unfortunately, blood pressure is elevated today, even on repeat. He is asymptomatic from a blood pressure perspective: no chest pain or palpitations, no shortness of breath, no light-headedness or dizziness, no headaches or vision changes.    Patient's acid reflux is well controlled on omeprazole.  Patient's cholesterol is well controlled on simvastatin (labs up to date).  Patient skin irritation is well controlled with triamcinolone cream as needed.    OBJECTIVE                                                      BP (!) 148/90   Pulse 70   Temp 97.3  F (36.3  C) (Temporal)   Resp 16   Wt 76 kg (167 lb 8 oz)   SpO2 98%   BMI 24.74 kg/m    Constitutional: well-appearing  Respiratory: normal respiratory effort; clear to auscultation bilaterally  Cardiovascular: regular rate and rhythm; no edema  Musculoskeletal: normal gait and station  Psych: normal mood and affect; normal judgment and insight; recent and remote memory intact    ASSESSMENT/PLAN                                                      (I10) Benign essential hypertension  Comment: poorly-controlled on current regimen.  Plan: patient will monitor blood pressures at home and notify MD if they are elevated out of office; for now continue present management; refills provided.     (K21.9) Gastroesophageal reflux disease, esophagitis presence not specified  Comment: well-controlled on current regimen.    Plan: continue present management; refills provided.     (E78.00) Pure hypercholesterolemia  Comment: well-controlled on current regimen.    Plan: continue present management; refills provided.     (R23.8) Skin irritation  Comment: well-controlled on current regimen.    Plan: continue  present management; refills provided.     Kim Durbin MD   [place] [address] [address] [address]  T: [phone], F: [phone]  (Note documentation was completed, in part, with [employer] voice-recognition software. Documentation was reviewed, but some grammatical, spelling, and word errors may remain.)

## 2021-01-28 ENCOUNTER — OFFICE VISIT (OUTPATIENT)
Dept: INTERNAL MEDICINE | Facility: CLINIC | Age: 71
End: 2021-01-28
Payer: COMMERCIAL

## 2021-01-28 VITALS
SYSTOLIC BLOOD PRESSURE: 150 MMHG | DIASTOLIC BLOOD PRESSURE: 72 MMHG | BODY MASS INDEX: 24.69 KG/M2 | OXYGEN SATURATION: 98 % | RESPIRATION RATE: 16 BRPM | TEMPERATURE: 98.4 F | WEIGHT: 167.2 LBS | HEART RATE: 83 BPM

## 2021-01-28 DIAGNOSIS — Z23 ENCOUNTER FOR IMMUNIZATION: ICD-10-CM

## 2021-01-28 DIAGNOSIS — H66.90 ACUTE OTITIS MEDIA, UNSPECIFIED OTITIS MEDIA TYPE: Primary | ICD-10-CM

## 2021-01-28 PROCEDURE — 99213 OFFICE O/P EST LOW 20 MIN: CPT | Mod: 25 | Performed by: INTERNAL MEDICINE

## 2021-01-28 PROCEDURE — 90471 IMMUNIZATION ADMIN: CPT | Performed by: INTERNAL MEDICINE

## 2021-01-28 PROCEDURE — 90662 IIV NO PRSV INCREASED AG IM: CPT | Performed by: INTERNAL MEDICINE

## 2021-01-28 NOTE — PROGRESS NOTES
Assessment & Plan     Acute otitis media, unspecified otitis media type  Unclear if this is true AOM. Also considering parotiditis vs abscess vs AOE. The partial treatment is muddying the picture a bit. Will treat with Augmentin as this should cover most pathologies in this area of the body. He'll monitor his symptoms.  - amoxicillin-clavulanate (AUGMENTIN) 875-125 MG tablet; Take 1 tablet by mouth 2 times daily for 5 days    Encounter for immunization  - [place] FLU VACCINE, INCREASED ANTIGEN, PRESV FREE ([id number])    Return in about 3 months (around 4/28/2021) for Physical Exam.    Omi Garcia MD  [facility]    Piyush Pedro is a 70 year old who presents to clinic today for the following health issues:    HPI   Concern - Ear problem   Onset: 1 week   Description: R ear pain and fullness  Intensity: moderate  Progression of Symptoms:  improving  Accompanying Signs & Symptoms: Jaw pain   Previous history of similar problem: no  Precipitating factors:        Worsened by: nothing   Alleviating factors:        Improved by:   Therapies tried and outcome: couple days of penicillin, tylenol     Willis noted some decrease in hearing from his R ear about a week ago. He noted his R face was swollen around that time. He had some leftover penicillin and so started taking it. He thinks he's taken 7 doses in the course of 2ish days. Symptoms have improved over the last 24 hours. No cough. No CP. No sore throat. No difficulty swallowing. No f/c. No discharge from ear. Hearing still a bit difficult out of R ear.    Review of Systems   Constitutional, HEENT, cardiovascular, pulmonary, gi and gu systems are negative, except as otherwise noted.      Objective    BP (!) 150/72   Pulse 83   Temp 98.4  F (36.9  C) (Temporal)   Resp 16   Wt 75.8 kg (167 lb 3.2 oz)   SpO2 98%   BMI 24.69 kg/m    Body mass index is 24.69 kg/m .  Physical Exam   GENERAL: alert and in no distress.  EYES:  Infectious Disease Progress Note    Author: Cristy Holley M.D. Date & Time of service: 11/23/2020  12:20 PM    Chief Complaint:  Intrabdominal abscess and bacteremia     Interval History:   56 y.o. diabetic male admitted 11/3/2020.+ A. fib, CHD, and severe morbid obesity.  Seen by ID in the past for right foot abscess with cultures positive for MRSA and Proteus.  s/p GLF approximately 1 month ago and was admitted to [facility] with left upper quadrant pain.  CT at the time revealed a subcapsular splenic hematoma. Presented to ED complaining of abdominal pain.  11/9 AF WBC 9.4 Less pain in side but remains tender to touch. Denies SE Zyvox-counseled about sxs serotonin syndrome  11/11 T-max 99.7 WBC 15.4 patient underwent colectomy, colostomy placement and I&D of flank wound with wound VAC placement on 11/10/2020 by Dr. Desouza.  Postop course complicated by hypotension and transferred to the ICU.  On pressors.  Patient complaining of shortness of breath as well as lightheadedness.  Also has postop abdominal pain.  11/12 afebrile WBC 13.3.  Patient weaned off pressors overnight.  Operative cultures growing group D Enterococcus and yeast.  Patient feeling better today.  Improved shortness of breath.  Abdominal pain stable  11/13 afebrile WBC 13.8 patient continues to feel better.  He is inquiring when he can have food by mouth.  11/14 afebrile WBC 9.9.  Patient feeling a bit better today.  His diet has been slowly advanced.  He is however complaining of some sharp abdominal pain when he coughs  11/16  AF, O2 RA, abdominal pain and poor appetite.   11/17 AF, O2 1 L NC , WBC 19.1. Drain output 465 cc yesterday. Ongoing nausea, poor appetite and abdomen is quite tender on palpation.  Will broaden antibiotics.   11/18 AF, O2 1 L NC, WBC 18.1, Drain output- 610 cc. Patient still has abdominal pain and significant nausea.  Examined abdominal wound photos and discussed with wound care team.  He has a large wound  but clean appearing base with granulation tissue.  No obvious infection in the surrounding tissues.  Left side wound with packing in place, ongoing drainage but clearing .    AF, tachycardia and hypertensive , WBC 17.2, drain output-205 cc yesterday. C/o nausea and agree with plan to transition Flagyl to IV to see if this helps.     AF WBC 13.6 feels OK-abd pain controlled. Poor appetite and not eating much. Denies SE abx-nausea resolved     Review of Systems:  Review of Systems   Constitutional: Negative for chills and fever.   Gastrointestinal: Positive for abdominal pain. Negative for constipation, diarrhea, nausea and vomiting.        Poor appetite   All other systems reviewed and are negative.      Hemodynamics:  Temp (24hrs), Av.3 °C (97.3 °F), Min:36.1 °C (96.9 °F), Max:36.9 °C (98.5 °F)  Temperature: 36.1 °C (96.9 °F)  Pulse  Av.4  Min: 51  Max: 161   Blood Pressure: 120/69      Physical Exam:  Physical Exam  Vitals signs and nursing note reviewed.   Constitutional:       General: He is not in acute distress.     Appearance: He is obese. He is not ill-appearing, toxic-appearing or diaphoretic.   HENT:      Nose: No rhinorrhea.   Eyes:      Extraocular Movements: Extraocular movements intact.      Pupils: Pupils are equal, round, and reactive to light.   Neck:      Musculoskeletal: No neck rigidity.   Cardiovascular:      Rate and Rhythm: Normal rate. Rhythm irregular.   Pulmonary:      Effort: Pulmonary effort is normal. No respiratory distress.   Abdominal:      General: There is no distension.      Palpations: Abdomen is soft.      Tenderness: There is abdominal tenderness. There is no guarding.      Comments: Wound VAC in place, no sign infection in surrounding tissue  Ostomy     Musculoskeletal:      Right lower leg: Edema present.      Left lower leg: Edema present.   Lymphadenopathy:      Cervical: No cervical adenopathy.   Skin:     General: Skin is warm.      Coloration: Skin is  conjunctivae/corneas clear. EOMs grossly intact. Some mild edema noted along lateral R face. L ear WNL with good TM light reflex. R ear canal without tenderness when pulling on cartilage. External canal a bit erythematous but not overtly so. Swollen canal, can only see a portion of the TM but it does not look to be bulging. Some fluid behind it but I can't see much of the TM to assess the fluid.  HENT: NC/AT, facies symmetric.  RESP: No iWOB.  CV: RRR to DP.  GI: NT, ND, without guarding  MSK: No cyanosis or clubbing noted bilaterally in upper and/or lower extremities.  SKIN: No significant ulcers, lesions, or rashes on the visualized portions of the skin  NEURO: Alert. Oriented. Sensation grossly WNL.     not jaundiced.      Findings: Bruising present.   Neurological:      General: No focal deficit present.      Mental Status: He is alert and oriented to person, place, and time.   Psychiatric:         Mood and Affect: Mood normal.         Behavior: Behavior normal.         Meds:    Current Facility-Administered Medications:   •  metoclopramide  •  magnesium oxide  •  enoxaparin (LOVENOX) injection  •  modafinil  •  levothyroxine  •  HYDROcodone/acetaminophen  •  prochlorperazine  •  metoprolol SR  •  lisinopril  •  amiodarone  •  risperiDONE  •  traZODone  •  vancomycin  •  Metoprolol Tartrate  •  Respiratory Therapy Consult  •  MD Alert...Vancomycin per Pharmacy  •  cefTRIAXone (ROCEPHIN) IV  •  spironolactone  •  cetirizine  •  famotidine  •  fluconazole  •  insulin glargine  •  insulin regular **AND** POC Blood Glucose **AND** NOTIFY MD and PharmD **AND** glucose **AND** dextrose 50%  •  albuterol    Labs:  Recent Labs     11/21/20 0208 11/22/20 0300 11/23/20  0440   WBC 16.5* 15.1* 13.6*   RBC 4.04* 3.83* 3.70*   HEMOGLOBIN 10.4* 9.6* 9.4*   HEMATOCRIT 34.1* 32.5* 32.1*   MCV 84.4 84.9 86.8   MCH 25.7* 25.1* 25.4*   RDW 54.5* 54.4* 56.2*   PLATELETCT 189 182 188   MPV 9.4 9.8 10.2   NEUTSPOLYS 70.80 67.30 63.50   LYMPHOCYTES 14.00* 17.70* 20.60*   MONOCYTES 10.70 10.70 11.00   EOSINOPHILS 2.40 2.50 2.80   BASOPHILS 0.20 0.10 0.30     Recent Labs     11/21/20 0208 11/22/20  0300 11/23/20  0440   SODIUM 137 137 137   POTASSIUM 3.5* 3.9 3.8   CHLORIDE 96 97 97   CO2 36* 37* 36*   GLUCOSE 123* 98 104*   BUN 12 13 16     Recent Labs     11/21/20 0208 11/22/20  0300 11/23/20  0440   ALBUMIN 2.5* 2.6* 2.5*   TBILIRUBIN 0.7 0.6 0.5   ALKPHOSPHAT 77 81 86   TOTPROTEIN 5.5* 5.1* 5.2*   ALTSGPT 7 8 6   ASTSGOT 13 16 16   CREATININE 0.46* 0.61 0.70       Imaging:  Ct-abdomen-pelvis W/o    Result Date: 11/17/2020 11/17/2020 1:51 PM HISTORY/REASON FOR EXAM:  Nausea/vomiting; elevated WBC; R/O abscess. TECHNIQUE/EXAM  DESCRIPTION: CT scan of the abdomen and pelvis without contrast. Noncontrast helical scanning was obtained from the diaphragmatic domes through the pubic symphysis. Low dose optimization technique was utilized for this CT exam including automated exposure control and adjustment of the mA and/or kV according to patient size. COMPARISON: 11/8/2020. FINDINGS: There is a moderate left and small right pleural effusion with overlying atelectasis/consolidation. Trace free air is seen in the abdomen. Evaluation of parenchymal and vascular structures is limited by the lack of intravenous contrast. The liver appears  unremarkable. There are gallstones within the gallbladder. There is hypodensity along the anterior aspect of the spleen measuring approximately 4.3 x 2.9 cm. No adrenal mass is identified. There is right renal atrophy with cortical scarring. There is no  evidence of hydronephrosis. Pancreas appears unremarkable. Aorta is not aneurysmal. There are small inguinal, retroperitoneal and mesenteric lymph nodes. There is no evidence of bowel obstruction. Visualized appendix appears unremarkable. There is a left abdominal stoma. There is parastomal density measuring up to 2.8 x 2.1 cm likely representing a postsurgical hematoma. There is a small amount of free fluid in the abdomen and pelvis. Bladder is mildly distended. There is a surgical drain in the left abdomen. There is third spacing. Degenerative changes are seen in the spine. Compression deformities of T11 and T12 are again noted. Bones are demineralized.     Postsurgical changes with a left abdominal stoma. Parastomal densities may be related to postsurgical hematoma measuring up to 2.8 x 2.1 cm. Small amount of free fluid in the abdomen and pelvis. Collection along the anterior spleen measures approximately 2.9 x 4.3 cm is decreased in size compared to the prior examination. Evaluation is limited by lack of intravenous contrast. Third spacing. Trace free  intraperitoneal air is likely related to recent surgery. Moderate left and small right pleural effusions with overlying atelectasis/consolidation. Cortical scarring of the right kidney. Cholelithiasis.     Ct-abdomen-pelvis With    Result Date: 11/9/2020 11/8/2020 11:34 PM HISTORY/REASON FOR EXAM:  Abdominal infection suspected; Has perisplenic abscess with possible connection to bowel TECHNIQUE/EXAM DESCRIPTION: CT of the abdomen and pelvis with contrast. Contrast-enhanced helical scanning was obtained from the diaphragmatic domes through the pubic symphysis following the bolus administration of 100 mL of nonionic contrast (Omnipaque 350) without complication. Low dose optimization technique was utilized for this CT exam including automated exposure control and adjustment of the mA and/or kV according to patient size. COMPARISON: 11/3/2020 CT FINDINGS: The visualized lung bases show diminished small left pleural fluid that layers dependently. There is similar middle and left lower lobe atelectasis . Similar multichamber cardiac enlargement CT Abdomen: There is a new pigtail catheter in the anterior perisplenic fluid collection. The collection does diminish in size now measuring 58 x 33 from 79 x 40 mm. There is still gas within it and it is in direct contact with the splenic flexure of the colon. A small amount of gas is also seen in the more superficial soft tissues inferior to the pigtail catheter which crosses the ninth intercostal space No new abscess is detected Contrast is present in the small bowel but not yet in the transverse colon. No contrast extravasation is seen. No abnormal bowel dilatation The pancreas, adrenal glands and kidneys enhance normally. Mild hepatosplenomegaly Some low-density in the gallbladder likely indicating cholelithiasis There is no lymphadenopathy or aneurysmal change of the aorta. Compression deformities in the spine are again seen with subacute fractures resulting in anterior  wedging at T11 and T12 There is diffuse fat stranding indicating anasarca CT Pelvis: Visualized pelvic structures are unremarkable. Prostate is within normal limits for age. No lymphadenopathy. There is no free air or free fluid.     Interval decrease in size of perisplenic abscess. Pigtail catheter now in place Abscess has likely fistulous communication with the abutting splenic flexure of the colon. Colon is otherwise normal in appearance Mild hepatosplenomegaly Decreasing small left pleural effusion Subacute anterior wedge compression fractures in the caudal thoracic spine    Ct-abdomen-pelvis With    Addendum Date: 11/5/2020    Addendum: A prior noncontrast CT of the abdomen and pelvis from [facility] dated 10/19/2020 was made available for comparison. On the prior study a perisplenic/subcapsular fluid collection was seen however did not contain air at that time and there was no discrete evidence of communication with the transverse colon. Communication with splenic flexure of the colon and air within the collection are new from the prior study.    Result Date: 11/5/2020  11/3/2020 4:02 PM HISTORY/REASON FOR EXAM:  Abd pain, unspecified; IV contrast only. TECHNIQUE/EXAM DESCRIPTION:   CT scan of the abdomen and pelvis with contrast. Contrast-enhanced helical scanning was obtained from the diaphragmatic domes through the pubic symphysis following the bolus administration of nonionic contrast without complication. 100 mL of Omnipaque 350 nonionic contrast was administered without complication. Low dose optimization technique was utilized for this CT exam including automated exposure control and adjustment of the mA and/or kV according to patient size. COMPARISON: 9/26/2018 FINDINGS: Chest Base: Small left pleural effusion with mild bibasilar atelectasis. Heart is mildly enlarged. Liver:  Diffuse hypoattenuation of the liver suggesting fatty infiltration. Moderately enlarged. Gallbladder:  Cholelithiasis. Biliary tract: Nondilated. Pancreas: Normal. Spleen: There is a rim-enhancing perisplenic fluid collection which contains air and which communicates with the splenic flexure of the colon. This most likely represents an abscess and measures about 8.7 x 7.9 cm. The anterior aspect of the spleen is nonenhancing with irregular margins could be related to infection of splenic parenchyma or infarction. Adrenals: Normal. Kidneys and Collecting Systems:  There is mild right renal cortical scarring. No hydronephrosis. No renal mass. Gastrointestinal tract:  No bowel obstruction. The appendix is normal. Peritoneum: No free air or free fluid. Reproductive organs:  Normal. Bladder:  Normal. Vessels:  Normal caliber. Lymph  Nodes:  No lymphadenopathy. Abdominal wall: Within normal limits. Bones:  Age indeterminate possibly subacute to chronic T11 and T12 superior endplate compression fractures.     1.  Peripherally enhancing perisplenic fluid collection which communicates with the splenic flexure of the colon and which contains foci of air is most consistent with an abscess/infected fluid collection. There is nonenhancement of the anterior aspect of the spleen suggesting infection/infarction. Surgical consultation is recommended. 2.  Hepatomegaly and hepatic steatosis. 3.  Cholelithiasis. 4.  Right renal cortical scarring. No hydronephrosis. 5.  Small left pleural effusion. Mild bibasilar atelectasis. 6.  Subacute to chronic appearing T11 and T12 superior endplate compression fractures. These findings were discussed with SVETLANA PAREDES on 11/3/2020 4:49 PM.     Ct-drain-peritoneal    Result Date: 11/4/2020 11/4/2020 5:19 PM HISTORY/REASON FOR EXAM:  Joya Splenic Fluid Collection TECHNIQUE/EXAM DESCRIPTION: LUQ parasplenic abscess drainage with CT guidance. Low dose optimization technique was utilized for this CT exam including automated exposure control and adjustment of the mA and/or kV according to patient  size. PROCEDURE: Informed consent was obtained. SEDATION: Moderate sedation was provided. Pulse oximetry and continuous cardiac monitoring by the nurse was performed throughout the exam. Intraservice time was 30 minutes. Localizing CT images were obtained with the patient in supine position. The skin was prepped with Betadine and draped in a sterile fashion. Following local anesthesia with 1% Lidocaine, a 17 G guiding needle was placed and needle path confirmed with CT. An Amplatz guidewire was placed and following serial tract dilatation, a 10 Tristanian pigtail locking catheter was placed. A specimen was collected and submitted for culture and sensitivity and Gram stain. Total of 6 mL of Brownish fluid was drained. The catheter was secured to the skin and connected to suction bulb drainage. Final CT images were obtained documenting catheter position. The patient tolerated the procedure well with no evidence of complication. COMPARISON: CT scan abdomen and pelvis 11/3/2020 FINDINGS: The final CT images show satisfactory catheter position within the target collection.     1.  CT guided perisplenic abscess catheter drainage. 2.  The current plan is to obtain a follow-up CT in 5-7 days..    [id number] View    Result Date: 11/20/2020 11/20/2020 2:00 PM HISTORY/REASON FOR EXAM:  Abdominal Pain. TECHNIQUE/EXAM DESCRIPTION AND NUMBER OF VIEWS:  1 view(s) of the abdomen. COMPARISON: None FINDINGS: No evidence for small bowel obstruction. Surgical clips present in the mid abdomen. LEFT mid abdominal stoma noted. Surgical drain present in the LEFT lateral abdomen. Catheter projects of the RIGHT midabdomen, possibly wound VAC. Degenerative change of lumbar spine.     1.  No evidence of bowel obstruction. 2.  Postoperative changes as described.    Dx-chest-portable (1 View)    Result Date: 11/17/2020 11/17/2020 9:19 AM HISTORY/REASON FOR EXAM: Elevated white blood cell count. TECHNIQUE/EXAM DESCRIPTION AND NUMBER OF VIEWS:  Single portable view of the chest. COMPARISON: 11/10/2020 FINDINGS: There are bilateral interstitial and airspace infiltrates. There is small bilateral pleural effusion. The heart is enlarged. Right subclavian central line is again seen.     1.  Bilateral airspace and interstitial infiltrates. 2.  Cardiomegaly.    Dx-chest-portable (1 View)    Result Date: 11/10/2020  11/10/2020 12:29 PM HISTORY/REASON FOR EXAM: new central line. TECHNIQUE/EXAM DESCRIPTION AND NUMBER OF VIEWS: Single AP view of the chest. COMPARISON: November 3 FINDINGS: Hardware: ] Tip overlies the cavoatrial junction Lungs: Linear middle lobe and left basilar opacity is seen. Volumes are low Pleura:  No pneumothorax is identified Heart and mediastinum: There is stable cardiac silhouette enlargement.     No pneumothorax identified following right subclavian line placement Worsening atelectasis with consolidation at the bases not excluded Stable cardiac silhouette enlargement    Dx-chest-portable (1 View)    Result Date: 11/3/2020  11/3/2020 3:41 PM HISTORY/REASON FOR EXAM:  Chest Pain. TECHNIQUE/EXAM DESCRIPTION AND NUMBER OF VIEWS: Single portable view of the chest. COMPARISON: September 10, 2020 FINDINGS: The cardiac size is enlarged. There is interstitial prominence. Lung aeration has improved. No pneumothorax. No obvious pleural effusion.     Slight interval interval improvement in congestive heart failure.    Us-renal    Result Date: 11/11/2020 11/11/2020 6:21 PM HISTORY/REASON FOR EXAM:  Abnormal Labs TECHNIQUE/EXAM DESCRIPTION: Renal ultrasound. COMPARISON:  None FINDINGS: The right kidney measures 9.39 cm. The left kidney measures 9.54 cm. There is no hydronephrosis. There are no abnormal calcifications. The bladder not fully the time of imaging.      Examination was limited due to poor sound penetration related to body habitus. Grossly normal appearance of the kidneys.    Ec-echocardiogram Complete W/ Cont    Result Date:  11/11/2020  Transthoracic Echo Report Echocardiography Laboratory CONCLUSIONS Normal left ventricular chamber size. Moderately reduced left ventricular systolic function. Left ventricular ejection fraction is visually estimated to be 30-40%; variable related to atrial fibrillation. Mild mitral regurgitation. Moderately dilated right ventricle. Reduced right ventricular systolic function. Right heart pressures are normal. No prior study is available for comparison. LV EF:        % FINDINGS Left Ventricle 3 mL of contrast was administered. Contrast was used to enhance visualization of the endocardial border. Existing IV was used. Mild concentric left ventricular hypertrophy. Normal left ventricular chamber size.  Moderately reduced left ventricular systolic function. Left ventricular ejection fraction is visually estimated to be 30-40%. Diastolic function is difficult to assess with atrial fibrillation. Right Ventricle Moderately dilated right ventricle. Reduced right ventricular systolic function. Right Atrium Enlarged right atrium. Normal inferior vena cava size and inspiratory collapse. Left Atrium Normal left atrial size. Mitral Valve Structurally normal mitral valve. Mild mitral regurgitation. No mitral stenosis. Aortic Valve Tricuspid aortic valve. Structurally normal aortic valve. No stenosis or regurgitation seen. Tricuspid Valve Structurally normal tricuspid valve. Mild tricuspid regurgitation. Right atrial pressure is estimated to be 3 mmHg. Estimated right ventricular systolic pressure  is 25 mmHg. No tricuspid stenosis. Right heart pressures are normal. Pulmonic Valve Structurally normal pulmonic valve without significant stenosis or regurgitation. Pericardium Normal pericardium without effusion. Aorta The aortic root is normal.  Ascending aorta diameter is 3.6 cm. Germain Pacheco M.D. (Electronically Signed) Final Date:     11 November 2020                 12:38 Amended:        11 November 2020  12:40      Micro:  Results     Procedure Component Value Units Date/Time    URINALYSIS [[id number]]  (Abnormal) Collected: 11/17/20 2302    Order Status: Completed Specimen: Urine, Clean Catch Updated: 11/17/20 2350     Color Yellow     Character Cloudy     Specific Gravity 1.018     Ph 5.0     Glucose Negative mg/dL      Ketones Negative mg/dL      Protein 30 mg/dL      Bilirubin Negative     Urobilinogen, Urine 0.2     Nitrite Negative     Leukocyte Esterase Negative     Occult Blood Small     Micro Urine Req Microscopic    Narrative:      Collected By:[id number] ELDER SENA          Assessment:  Active Hospital Problems    Diagnosis   • *Spontaneous perforation of colon ([facility]) [K63.1]   • Leucocytosis [D72.829]   • Schizophrenia ([facility]) [F20.9]   • Sepsis ([facility]) [A41.9]   • Elevated digoxin level [R78.89]   • SRUTHI (acute kidney injury) ([facility]) [N17.9]   • Adrenal insufficiency ([facility]) [E27.40]   • LV dysfunction [I51.9]   • Paroxysmal A-fib with RVR ([facility]) [I48.0]   • Type 2 diabetes mellitus, without long-term current use of insulin, with peripheral neuropathy ([facility]) [E11.9]   • Hypothyroidism [E03.9]   • Prolonged QT interval [R94.31]     Hospital Course:   CT scan on 11/3 revealed a peripherally enhancing perisplenic fluid collection measuring 8.7 x 7.9 cm, communicating with the splenic flexure of the colon and contained foci of air consistent with abscess/infected fluid.  Also noted nonenhancing of the anterior aspect of the spleen suggesting infection/infarction.    Status post surgery on 11/10/2020    Leukocytosis -persistent. Decreasing     Acute kidney injury- improved   Renal ultrasound-unremarkable  Nephrology following  Avoid nephrotoxins     Bacteremia  -Blood cultures on 11/31/2 + viridans streptococci, possible contaminant  -Blood culture on 11/5 with no growth to date     Perisplenic abscess with possible communication to the bowel  Left upper quadrant abscess     Recent ground-level fall located by a  subcapsular splenic hematoma  Afebrile  Splenic enhancement on CT, suggesting infection or infarction  -s/p drainage 11/4 and peritoneal fluid Efaecalis, ampicillin sensitive and C albicans  S/p segmental colectomy, colostomy, irrigation and debridement of flank wound and wound VAC placement on 11/10/2020 by Dr. Desouza.  Patient found to have a left upper quadrant abscess inferior to the spleen with a large bowel wall defect.  Also noted to have an abdominal wall abscess.  Op cultures (colon perforation) - E faecalis and Candida albicans.  Gram stain+ GPC, GPR  -CT abdomen pelvis without on 11/17-hypodensity along anterior aspect of the spleen measuring 4.3 x 2.9 cm.  Parastomal density measuring two-point by 2.1 cm likely representing postsurgical hematoma.  This is decreased in size from prior.  Small amount of free fluid in the abdomen pelvis.  -Drain removed on 11/21     Continue vancomycin to treat the Enterococcus (PCN allergic), continue ceftriaxone to provide gram-negative coverage.  Flagyl stopped as he had ongoing nausea even with IV.   Antibiotic selection is limited due to his numerous allergies.  Continue fluconazole 400 mg daily  for the Candida  Monitor CBC and CMP   Repeat CT abdomen and pelvis with contrast on 11/24 -depending on results may be able to give final recommendations  Anticipate 10 day course from date of last surgery     Diabetes mellitus  Keep BS under 150 to help control current infection     A. Fib  Contributing to hypotension  Cards following        Antibiotic allergies: Daptomycin rash, penicillins rash and hives, tolerates cephalosporins-discussed allergies and he states that he has developed significant rash and hives with penicillins several times, most recently a year and a half ago- will avoid penicillins

## 2021-09-28 DIAGNOSIS — K21.9 GASTROESOPHAGEAL REFLUX DISEASE, UNSPECIFIED WHETHER ESOPHAGITIS PRESENT: ICD-10-CM

## 2021-09-28 DIAGNOSIS — I10 BENIGN ESSENTIAL HYPERTENSION: ICD-10-CM

## 2021-09-28 RX ORDER — LISINOPRIL 40 MG/1
40 TABLET ORAL DAILY
Qty: 30 TABLET | Refills: 0 | Status: SHIPPED | OUTPATIENT
Start: 2021-09-28 | End: 2021-11-18

## 2021-09-28 RX ORDER — OMEPRAZOLE 40 MG/1
40 CAPSULE, DELAYED RELEASE ORAL DAILY
Qty: 30 CAPSULE | Refills: 0 | Status: SHIPPED | OUTPATIENT
Start: 2021-09-28 | End: 2021-11-05

## 2021-09-28 NOTE — TELEPHONE ENCOUNTER
Routing refill request to provider for review/approval because:  BP Readings from Last 3 Encounters:   01/28/21 (!) 150/72   09/21/20 (!) 148/90   07/31/19 114/68     Creatinine   Date Value Ref Range Status   09/17/2020 1.06 0.66 - 1.25 mg/dL Final     Potassium   Date Value Ref Range Status   09/17/2020 4.6 3.4 - 5.3 mmol/L Final           Pippa Delaney RN  [facility]

## 2021-09-30 DIAGNOSIS — E78.00 PURE HYPERCHOLESTEROLEMIA: ICD-10-CM

## 2021-10-01 NOTE — TELEPHONE ENCOUNTER
Routing refill request to provider for review/approval because:  LDL Cholesterol Calculated   Date Value Ref Range Status   09/17/2020 90 <100 mg/dL Final     Comment:     Desirable:       <100 mg/dl         Jose Sheldon RN  [facility] Triage Nurse

## 2021-10-03 RX ORDER — SIMVASTATIN 40 MG
40 TABLET ORAL AT BEDTIME
Qty: 90 TABLET | Refills: 0 | Status: SHIPPED | OUTPATIENT
Start: 2021-10-03 | End: 2021-11-18

## 2021-11-17 PROBLEM — E78.00 PURE HYPERCHOLESTEROLEMIA: Status: ACTIVE | Noted: 2021-11-17

## 2021-11-17 PROBLEM — K21.9 ACID REFLUX DISEASE: Status: ACTIVE | Noted: 2021-11-17

## 2021-11-17 PROBLEM — I10 BENIGN ESSENTIAL HYPERTENSION: Status: ACTIVE | Noted: 2021-11-17

## 2021-11-18 ENCOUNTER — OFFICE VISIT (OUTPATIENT)
Dept: INTERNAL MEDICINE | Facility: CLINIC | Age: 71
End: 2021-11-18
Payer: COMMERCIAL

## 2021-11-18 VITALS
DIASTOLIC BLOOD PRESSURE: 64 MMHG | RESPIRATION RATE: 18 BRPM | TEMPERATURE: 97.1 F | OXYGEN SATURATION: 98 % | SYSTOLIC BLOOD PRESSURE: 156 MMHG | WEIGHT: 162 LBS | BODY MASS INDEX: 23.99 KG/M2 | HEIGHT: 69 IN | HEART RATE: 83 BPM

## 2021-11-18 DIAGNOSIS — E78.00 PURE HYPERCHOLESTEROLEMIA: ICD-10-CM

## 2021-11-18 DIAGNOSIS — K21.9 GASTROESOPHAGEAL REFLUX DISEASE WITHOUT ESOPHAGITIS: ICD-10-CM

## 2021-11-18 DIAGNOSIS — Z13.1 SCREENING FOR DIABETES MELLITUS: ICD-10-CM

## 2021-11-18 DIAGNOSIS — Z23 NEED FOR PROPHYLACTIC VACCINATION AND INOCULATION AGAINST INFLUENZA: ICD-10-CM

## 2021-11-18 DIAGNOSIS — I10 BENIGN ESSENTIAL HYPERTENSION: ICD-10-CM

## 2021-11-18 DIAGNOSIS — R23.8 SKIN IRRITATION: ICD-10-CM

## 2021-11-18 DIAGNOSIS — Z00.00 ROUTINE HISTORY AND PHYSICAL EXAMINATION OF ADULT: Primary | ICD-10-CM

## 2021-11-18 DIAGNOSIS — Z23 HIGH PRIORITY FOR 2019-NCOV VACCINE: ICD-10-CM

## 2021-11-18 LAB
ALBUMIN SERPL-MCNC: 4.3 G/DL (ref 3.4–5)
ALP SERPL-CCNC: 96 U/L (ref 40–150)
ALT SERPL W P-5'-P-CCNC: 25 U/L (ref 0–70)
ANION GAP SERPL CALCULATED.3IONS-SCNC: 5 MMOL/L (ref 3–14)
AST SERPL W P-5'-P-CCNC: 13 U/L (ref 0–45)
BILIRUB SERPL-MCNC: 0.4 MG/DL (ref 0.2–1.3)
BUN SERPL-MCNC: 11 MG/DL (ref 7–30)
CALCIUM SERPL-MCNC: 9.5 MG/DL (ref 8.5–10.1)
CHLORIDE BLD-SCNC: 105 MMOL/L (ref 94–109)
CHOLEST SERPL-MCNC: 177 MG/DL
CO2 SERPL-SCNC: 29 MMOL/L (ref 20–32)
CREAT SERPL-MCNC: 1 MG/DL (ref 0.66–1.25)
FASTING STATUS PATIENT QL REPORTED: YES
GFR SERPL CREATININE-BSD FRML MDRD: 75 ML/MIN/1.73M2
GLUCOSE BLD-MCNC: 98 MG/DL (ref 70–99)
HDLC SERPL-MCNC: 55 MG/DL
LDLC SERPL CALC-MCNC: 77 MG/DL
NONHDLC SERPL-MCNC: 122 MG/DL
POTASSIUM BLD-SCNC: 3.8 MMOL/L (ref 3.4–5.3)
PROT SERPL-MCNC: 7.9 G/DL (ref 6.8–8.8)
SODIUM SERPL-SCNC: 139 MMOL/L (ref 133–144)
TRIGL SERPL-MCNC: 227 MG/DL

## 2021-11-18 PROCEDURE — 90662 IIV NO PRSV INCREASED AG IM: CPT | Performed by: INTERNAL MEDICINE

## 2021-11-18 PROCEDURE — 36415 COLL VENOUS BLD VENIPUNCTURE: CPT | Performed by: INTERNAL MEDICINE

## 2021-11-18 PROCEDURE — 90471 IMMUNIZATION ADMIN: CPT | Performed by: INTERNAL MEDICINE

## 2021-11-18 PROCEDURE — 80061 LIPID PANEL: CPT | Performed by: INTERNAL MEDICINE

## 2021-11-18 PROCEDURE — 99397 PER PM REEVAL EST PAT 65+ YR: CPT | Mod: 25 | Performed by: INTERNAL MEDICINE

## 2021-11-18 PROCEDURE — 91300 COVID-19,PF,PFIZER (12+ YRS): CPT | Performed by: INTERNAL MEDICINE

## 2021-11-18 PROCEDURE — 80053 COMPREHEN METABOLIC PANEL: CPT | Performed by: INTERNAL MEDICINE

## 2021-11-18 PROCEDURE — 0004A COVID-19,PF,PFIZER (12+ YRS): CPT | Performed by: INTERNAL MEDICINE

## 2021-11-18 RX ORDER — SIMVASTATIN 40 MG
40 TABLET ORAL AT BEDTIME
Qty: 90 TABLET | Refills: 3 | Status: SHIPPED | OUTPATIENT
Start: 2021-11-18 | End: 2022-11-28

## 2021-11-18 RX ORDER — OMEPRAZOLE 40 MG/1
40 CAPSULE, DELAYED RELEASE ORAL DAILY
Qty: 90 CAPSULE | Refills: 3 | Status: SHIPPED | OUTPATIENT
Start: 2021-11-18 | End: 2022-11-28

## 2021-11-18 RX ORDER — AMLODIPINE BESYLATE 10 MG/1
10 TABLET ORAL DAILY
Qty: 90 TABLET | Refills: 3 | Status: SHIPPED | OUTPATIENT
Start: 2021-11-18 | End: 2022-11-28

## 2021-11-18 RX ORDER — TRIAMCINOLONE ACETONIDE 1 MG/G
CREAM TOPICAL 2 TIMES DAILY
Qty: 15 G | Refills: 1 | Status: SHIPPED | OUTPATIENT
Start: 2021-11-18 | End: 2022-11-04

## 2021-11-18 RX ORDER — LISINOPRIL 40 MG/1
40 TABLET ORAL DAILY
Qty: 90 TABLET | Refills: 3 | Status: SHIPPED | OUTPATIENT
Start: 2021-11-18 | End: 2022-11-28

## 2021-11-18 ASSESSMENT — MIFFLIN-ST. JEOR: SCORE: 1475.21

## 2021-11-18 NOTE — PROGRESS NOTES
ASSESSMENT/PLAN                                                       (Z00.00) Routine history and physical examination of adult  (primary encounter diagnosis)  Comment: PMH, PSH, FH, SH, medications, allergies, immunizations, and preventative health measures reviewed and updated as appropriate.  Plan: see below for plans.      (Z23) Need for prophylactic vaccination and inoculation against influenza  Plan: flu shot given today.     (Z23) High priority for 2019-nCoV vaccine  Plan: COVID-19 booster given today.     (E78.00) Pure hypercholesterolemia  (Z13.1) Screening for diabetes mellitus  Plan: fasting labs today; for now, continue present management; refills provided.     (I10) Benign essential hypertension  Comment: suboptimally controlled on current regimen but within normal limits/only mildly elevated at home.   Plan: continue present management for now; refills provided.     (R23.8) Skin irritation  Comment: well-controlled on current regimen.    Plan: continue present management; refills provided.     (K21.9) Gastroesophageal reflux disease without esophagitis  Comment: well-controlled on current regimen.    Plan: continue present management; refills provided.     Kim Durbin MD   [address] [address]  T: [phone], F: [phone]    SUBJECTIVE                                                      Willis De La Cruz is a very pleasant 71 year old male who presents for a physical.    ROS:  Constitutional: no unintentional weight loss or gain reported; no fevers, chills, or sweats  reported    Cardiovascular: no chest pain, palpitations, or edema reported  Respiratory: no cough, wheezing, shortness of breath, or dyspnea on exertion reported  Gastrointestinal: no nausea, vomiting, constipation, diarrhea, or abdominal pain reported  Genitourinary: no urinary frequency, urgency, dysuria, or hematuria reported  Integumentary: no rash or pruritus  reported  Musculoskeletal: no back pain, muscle pain, joint pain, or joint swelling reported  Neurologic: no focal weakness, numbness, or tingling reported  Hematologic: no easy bruising or bleeding reported  Endocrine: no heat or cold intolerance reported; no polyuria or polydipsia reported  Psychiatric: no anxiety or depression reported    Past Medical History:   Diagnosis Date     Acid reflux disease      Benign essential hypertension      History of prostate cancer 2010     Pure hypercholesterolemia      Past Surgical History:   Procedure Laterality Date     COLONOSCOPY  04/28/2012    Procedure:COLONOSCOPY; COLONOSCOPY; Surgeon:ERNIE ESPINOSA; Location: [facility]     HERNIORRHAPHY INGUINAL  10/07/2013    Procedure: HERNIORRHAPHY INGUINAL;  RIGHT INGUINAL HERNIA REPAIR WITH MESH;  Surgeon: Marcus Becker MD;  Location:  [facility]     PROSTATECTOMY RETROPUBIC RADICAL  01/01/2010     Family History   Problem Relation Age of Onset     Hypertension Mother      Cerebrovascular Disease Mother      Kidney Cancer Father      Hypertension Sister      Hypertension Brother         x2 brothers     Prostate Cancer Maternal Uncle      Social History     Occupational History     Occupation: Retired - engineering   Tobacco Use     Smoking status: Never Smoker     Smokeless tobacco: Never Used   Substance and Sexual Activity     Alcohol use: Yes     Comment: rare     Drug use: No     Sexual activity: Yes   Social History Narrative    .    Two living sons. Step son passed away.    5 grandchildren.    Stays active and busy during the day.      Allergies   Allergen Reactions     Aspirin Hives     Nsaids Hives     Current Outpatient Medications   Medication Sig     amLODIPine (NORVASC) 10 MG tablet Take 1 tablet (10 mg) by mouth daily     lisinopril (ZESTRIL) 40 MG tablet Take 1 tablet (40 mg) by mouth daily     omeprazole (PRILOSEC) 40 MG DR capsule Take 1 capsule (40 mg) by mouth daily     simvastatin (ZOCOR) 40 MG tablet  "Take 1 tablet (40 mg) by mouth At Bedtime     triamcinolone (KENALOG) 0.1 % external cream Apply topically 2 times daily     Immunization History   Administered Date(s) Administered     COVID-19,PF,Pfizer (12+ Yrs) 03/04/2021, 03/25/2021, 11/18/2021     Influenza (IIV3) PF 12/06/2005, 11/01/2009, 10/01/2013     Influenza, Quad, High Dose, Pf, 65yr+ (Fluzone HD) 01/28/2021, 11/18/2021     Pneumo Conj 13-V (2010&after) 05/04/2017     Pneumococcal 23 valent 07/09/2018     TD (ADULT, 7+) 12/06/2005     TDAP Vaccine (Adacel) 03/17/2016     PREVENTATIVE HEALTH                                                      BMI: within normal limits   Blood pressure: elevated on current regimen  Prostate CA Screening: screening no longer indicated  Colon CA screening: up to date   Lung CA screening: n/a   AAA screening: n/a   Screening cholesterol: n/a - already being treated for this condition  Screening diabetes: DUE  STD testing: no risk factors present  Alcohol misuse screening: alcohol use reviewed - no intervention indicated at this time  Immunizations: reviewed; flu shot and COVID-19 booster DUE    OBJECTIVE                                                      BP (!) 156/64 (BP Location: Left arm, Patient Position: Chair, Cuff Size: Adult Regular)   Pulse 83   Temp 97.1  F (36.2  C) (Temporal)   Resp 18   Ht 1.753 m (5' 9\")   Wt 73.5 kg (162 lb)   SpO2 98%   BMI 23.92 kg/m    Constitutional: well-appearing  Head, Ears, and Eyes: normocephalic; normal external auditory canal and pinna; tympanic membranes visualized and normal; normal lids and conjunctivae  Neck: supple, symmetric, no thyromegaly or lymphadenopathy  Respiratory: normal respiratory effort; clear to auscultation bilaterally  Cardiovascular: regular rate and rhythm; no edema  Gastrointestinal: soft, non-tender, and non-distended; no organomegaly or masses  Musculoskeletal: normal gait and station  Psych: normal judgment and insight; normal mood and affect; " recent and remote memory intact    ---    (Note was completed, in part, with [employer] voice-recognition software. Documentation was reviewed, but some grammatical, spelling, and word errors may remain.)

## 2022-11-22 ASSESSMENT — ENCOUNTER SYMPTOMS
PALPITATIONS: 0
HEADACHES: 0
ABDOMINAL PAIN: 0
SHORTNESS OF BREATH: 0
JOINT SWELLING: 0
HEARTBURN: 0
MYALGIAS: 0
SORE THROAT: 0
FEVER: 0
NERVOUS/ANXIOUS: 0
DIARRHEA: 0
CONSTIPATION: 0
PARESTHESIAS: 0
HEMATOCHEZIA: 0
HEMATURIA: 0
WEAKNESS: 0
FREQUENCY: 0
COUGH: 0
DIZZINESS: 0
DYSURIA: 0
NAUSEA: 0
ARTHRALGIAS: 0
CHILLS: 0
EYE PAIN: 0

## 2022-11-22 ASSESSMENT — ACTIVITIES OF DAILY LIVING (ADL): CURRENT_FUNCTION: NO ASSISTANCE NEEDED

## 2022-11-28 ENCOUNTER — OFFICE VISIT (OUTPATIENT)
Dept: INTERNAL MEDICINE | Facility: CLINIC | Age: 72
End: 2022-11-28
Payer: COMMERCIAL

## 2022-11-28 VITALS
WEIGHT: 169.3 LBS | HEART RATE: 78 BPM | OXYGEN SATURATION: 97 % | BODY MASS INDEX: 25.07 KG/M2 | SYSTOLIC BLOOD PRESSURE: 134 MMHG | DIASTOLIC BLOOD PRESSURE: 68 MMHG | TEMPERATURE: 96.9 F | HEIGHT: 69 IN

## 2022-11-28 DIAGNOSIS — Z23 HIGH PRIORITY FOR 2019-NCOV VACCINE: ICD-10-CM

## 2022-11-28 DIAGNOSIS — I10 BENIGN ESSENTIAL HYPERTENSION: ICD-10-CM

## 2022-11-28 DIAGNOSIS — Z00.00 MEDICARE ANNUAL WELLNESS VISIT, SUBSEQUENT: Primary | ICD-10-CM

## 2022-11-28 DIAGNOSIS — Z23 NEED FOR PROPHYLACTIC VACCINATION AND INOCULATION AGAINST INFLUENZA: ICD-10-CM

## 2022-11-28 DIAGNOSIS — E78.00 PURE HYPERCHOLESTEROLEMIA: ICD-10-CM

## 2022-11-28 DIAGNOSIS — R23.8 SKIN IRRITATION: ICD-10-CM

## 2022-11-28 DIAGNOSIS — Z13.1 SCREENING FOR DIABETES MELLITUS: ICD-10-CM

## 2022-11-28 DIAGNOSIS — K21.9 GASTROESOPHAGEAL REFLUX DISEASE WITHOUT ESOPHAGITIS: ICD-10-CM

## 2022-11-28 DIAGNOSIS — Z12.11 SPECIAL SCREENING FOR MALIGNANT NEOPLASMS, COLON: ICD-10-CM

## 2022-11-28 DIAGNOSIS — Z12.5 SCREENING FOR PROSTATE CANCER: ICD-10-CM

## 2022-11-28 PROBLEM — R73.01 IMPAIRED FASTING GLUCOSE: Status: ACTIVE | Noted: 2022-11-28

## 2022-11-28 LAB
ALBUMIN SERPL BCG-MCNC: 4.7 G/DL (ref 3.5–5.2)
ALP SERPL-CCNC: 87 U/L (ref 40–129)
ALT SERPL W P-5'-P-CCNC: 16 U/L (ref 10–50)
ANION GAP SERPL CALCULATED.3IONS-SCNC: 13 MMOL/L (ref 7–15)
AST SERPL W P-5'-P-CCNC: 19 U/L (ref 10–50)
BILIRUB SERPL-MCNC: 0.3 MG/DL
BUN SERPL-MCNC: 8.7 MG/DL (ref 8–23)
CALCIUM SERPL-MCNC: 9.2 MG/DL (ref 8.8–10.2)
CHLORIDE SERPL-SCNC: 103 MMOL/L (ref 98–107)
CHOLEST SERPL-MCNC: 181 MG/DL
CREAT SERPL-MCNC: 0.93 MG/DL (ref 0.67–1.17)
DEPRECATED HCO3 PLAS-SCNC: 24 MMOL/L (ref 22–29)
GFR SERPL CREATININE-BSD FRML MDRD: 87 ML/MIN/1.73M2
GLUCOSE SERPL-MCNC: 113 MG/DL (ref 70–99)
HDLC SERPL-MCNC: 43 MG/DL
LDLC SERPL CALC-MCNC: 84 MG/DL
NONHDLC SERPL-MCNC: 138 MG/DL
POTASSIUM SERPL-SCNC: 3.6 MMOL/L (ref 3.4–5.3)
PROT SERPL-MCNC: 7.5 G/DL (ref 6.4–8.3)
PSA SERPL-MCNC: <0.01 NG/ML (ref 0–6.5)
SODIUM SERPL-SCNC: 140 MMOL/L (ref 136–145)
TRIGL SERPL-MCNC: 270 MG/DL

## 2022-11-28 PROCEDURE — 36415 COLL VENOUS BLD VENIPUNCTURE: CPT | Performed by: INTERNAL MEDICINE

## 2022-11-28 PROCEDURE — 80053 COMPREHEN METABOLIC PANEL: CPT | Performed by: INTERNAL MEDICINE

## 2022-11-28 PROCEDURE — 80061 LIPID PANEL: CPT | Performed by: INTERNAL MEDICINE

## 2022-11-28 PROCEDURE — 91312 COVID-19 VACCINE BIVALENT BOOSTER 12+ (PFIZER): CPT | Performed by: INTERNAL MEDICINE

## 2022-11-28 PROCEDURE — G0103 PSA SCREENING: HCPCS | Performed by: INTERNAL MEDICINE

## 2022-11-28 PROCEDURE — 0124A COVID-19 VACCINE BIVALENT BOOSTER 12+ (PFIZER): CPT | Performed by: INTERNAL MEDICINE

## 2022-11-28 PROCEDURE — 90662 IIV NO PRSV INCREASED AG IM: CPT | Performed by: INTERNAL MEDICINE

## 2022-11-28 PROCEDURE — G0439 PPPS, SUBSEQ VISIT: HCPCS | Performed by: INTERNAL MEDICINE

## 2022-11-28 PROCEDURE — 90471 IMMUNIZATION ADMIN: CPT | Performed by: INTERNAL MEDICINE

## 2022-11-28 RX ORDER — TRIAMCINOLONE ACETONIDE 1 MG/G
CREAM TOPICAL 2 TIMES DAILY
Qty: 30 G | Refills: 3 | Status: SHIPPED | OUTPATIENT
Start: 2022-11-28 | End: 2023-11-08

## 2022-11-28 RX ORDER — OMEPRAZOLE 40 MG/1
40 CAPSULE, DELAYED RELEASE ORAL DAILY
Qty: 90 CAPSULE | Refills: 3 | Status: SHIPPED | OUTPATIENT
Start: 2022-11-28 | End: 2023-11-08

## 2022-11-28 RX ORDER — AMLODIPINE BESYLATE 10 MG/1
10 TABLET ORAL DAILY
Qty: 90 TABLET | Refills: 3 | Status: SHIPPED | OUTPATIENT
Start: 2022-11-28 | End: 2023-11-08

## 2022-11-28 RX ORDER — LISINOPRIL 40 MG/1
40 TABLET ORAL DAILY
Qty: 90 TABLET | Refills: 3 | Status: SHIPPED | OUTPATIENT
Start: 2022-11-28 | End: 2023-11-08

## 2022-11-28 RX ORDER — SIMVASTATIN 40 MG
40 TABLET ORAL AT BEDTIME
Qty: 90 TABLET | Refills: 3 | Status: SHIPPED | OUTPATIENT
Start: 2022-11-28 | End: 2023-11-08

## 2022-11-28 NOTE — PROGRESS NOTES
ASSESSMENT/PLAN                                                       (Z00.00) Medicare annual wellness visit, subsequent  (primary encounter diagnosis)  Comment: PMH, PSH, FH, SH, medications, allergies, immunizations, and preventative health measures reviewed and updated as appropriate.  Plan: see below for plans.      (Z23) Need for prophylactic vaccination and inoculation against influenza  Plan: flu shot given today.    (Z23) High priority for 2019-nCoV vaccine  Plan: COVID-19 booster given today.    (Z12.11) Special screening for malignant neoplasms, colon  Plan: Cologuard ordered.     (E78.00) Pure hypercholesterolemia  (Z13.1) Screening for diabetes mellitus  (Z12.5) Screening for prostate cancer  Plan: fasting labs today.     (I10) Benign essential hypertension  Comment: well-controlled on current regimen.    Plan: continue present management; refills provided.     (K21.9) Gastroesophageal reflux disease without esophagitis  Comment: well-controlled on current regimen.    Plan: continue present management; refills provided.     (R23.8) Skin irritation  Comment: well-controlled on current regimen.    Plan: continue present management; refills provided.     Appropriate preventive services were discussed with this patient, including applicable screening as appropriate for cardiovascular disease, diabetes, osteopenia/osteoporosis, and glaucoma.  As appropriate for age/gender, discussed screening for colorectal cancer, prostate cancer, breast cancer, and cervical cancer. Checklist reviewing preventive services available has been given to the patient.    Reviewed patients plan of care. The Basic Care Plan (routine screening as documented in Health Maintenance) for Willis De La Cruz meets the Care Plan requirement. This Care Plan has been established and reviewed with the Patient.    Kim Durbin MD   [address] [address]  T: [phone], F:  [phone]    SUBJECTIVE                                                      Willis De La Cruz is a very pleasant 72 year old male who presents for his subsequent AWV:    Current providers (other than myself): n/a     PMH, PSH, FH, , medications, allergies, immunizations, preventative health, and health risk assessment reviewed.     Past Medical History:   Diagnosis Date     Acid reflux disease      Benign essential hypertension      History of prostate cancer 2010     Pure hypercholesterolemia      Past Surgical History:   Procedure Laterality Date     COLONOSCOPY  04/28/2012    Procedure:COLONOSCOPY; COLONOSCOPY; Surgeon:ERNIE ESPINOSA; Location: GI     HERNIORRHAPHY INGUINAL  10/07/2013    Procedure: HERNIORRHAPHY INGUINAL;  RIGHT INGUINAL HERNIA REPAIR WITH MESH;  Surgeon: Macrus Becker MD;  Location:  [facility]     PROSTATECTOMY RETROPUBIC RADICAL  01/01/2010     Family History   Problem Relation Age of Onset     Hypertension Mother      Cerebrovascular Disease Mother      Kidney Cancer Father      Hypertension Sister      Hypertension Brother         x2 brothers (one has passed, one still alive)     Prostate Cancer Maternal Uncle      Diabetes Type 2  No family hx of      Colon Cancer No family hx of      Myocardial Infarction No family hx of      Coronary Artery Disease Early Onset No family hx of      Social History     Occupational History     Occupation: Retired - engineering   Tobacco Use     Smoking status: Never     Smokeless tobacco: Never   Vaping Use     Vaping Use: Never used   Substance and Sexual Activity     Alcohol use: Yes     Comment: rare     Drug use: No     Sexual activity: Yes   Social History Narrative    .    Two living sons. Step son passed away.    5 grandchildren.    Stays active and busy during the day.      Allergies   Allergen Reactions     Aspirin Hives     Nsaids Hives     Current Outpatient Medications   Medication Sig     amLODIPine (NORVASC) 10 MG tablet Take  "1 tablet (10 mg) by mouth daily     lisinopril (ZESTRIL) 40 MG tablet Take 1 tablet (40 mg) by mouth daily     omeprazole (PRILOSEC) 40 MG DR capsule Take 1 capsule (40 mg) by mouth daily     simvastatin (ZOCOR) 40 MG tablet Take 1 tablet (40 mg) by mouth At Bedtime     triamcinolone (KENALOG) 0.1 % external cream Apply topically 2 times daily     Immunization History   Administered Date(s) Administered     COVID-19 Vaccine 12+ (Pfizer) 03/04/2021, 03/25/2021, 11/18/2021     COVID-19 Vaccine Bivalent Booster 12+ (Pfizer) 11/28/2022     Influenza (IIV3) PF 12/06/2005, 11/01/2009, 10/01/2013     Influenza Vaccine 65+ (Fluzone HD) 01/28/2021, 11/18/2021, 11/28/2022     Pneumo Conj 13-V (2010&after) 05/04/2017     Pneumococcal 23 valent 07/09/2018     TD (ADULT, 7+) 12/06/2005     TDAP Vaccine (Adacel) 03/17/2016     PREVENTATIVE HEALTH                                                      BMI: within normal limits   Blood pressure: well-controlled on current regimen   Colon CA screening: DUE  Lung CA screening: n/a   AAA screening: n/a   Screening cholesterol: n/a - already being treated for this condition  Screening diabetes: DUE  Alcohol misuse screening: alcohol use reviewed - no intervention indicated at this time  Immunizations: reviewed; flu shot and COVID-19 booster DUE and Shingrix series DUE - not covered by Medicare (may obtain in pharmacy if desired)     HEALTH RISK ASSESSMENT                                                      In general, how would you rate your overall physical health? good  Outside of work, how many days during the week do you exercise? None  Outside of work, approximately how many minutes a day do you exercise? n/a     If you drink alcohol do you typically have >3 drinks per day or >7 drinks per week? No  Do you usually eat at least 4 servings of fruit and vegetables a day, include whole grains & fiber and avoid regularly eating high fat or \"junk\" foods? No     Do you have any problems " "taking medications regularly? No  Do you have any side effects from medications? No    Assistance with daily activities: No    Safety concerns: No    Fall risk assessment: completed today (see ambulatory assessments)    Hearing concerns: YES - difficulty following a conversation in a noisy restaurant or crowded room, need to ask people to speak up or repeat themselves    In the past 6 months, have you been bothered by leaking of urine: No    In general, how would you rate your overall mental or emotional health: good    PHQ-2/PHQ-9 assessment: completed today (see ambulatory assessments)    Additional concerns today: No    OBJECTIVE                                                      /68   Pulse 78   Temp 96.9  F (36.1  C) (Tympanic)   Ht 1.74 m (5' 8.5\")   Wt 76.8 kg (169 lb 4.8 oz)   SpO2 97%   BMI 25.37 kg/m    Constitutional: well-appearing  Head, Ears, and Eyes: normocephalic; normal external auditory canal and pinna; tympanic membranes visualized and normal; normal lids and conjunctivae  Neck: supple, symmetric, no thyromegaly or lymphadenopathy  Respiratory: normal respiratory effort; clear to auscultation bilaterally  Cardiovascular: regular rate and rhythm; no edema  Gastrointestinal: soft, non-tender, and non-distended; no organomegaly or masses  Musculoskeletal: normal gait and station  Psych: normal judgment and insight; normal mood and affect; recent and remote memory intact    Cognitive Impairment noted: No    ---    (Note was completed, in part, with [employer] voice-recognition software. Documentation was reviewed, but some grammatical, spelling, and word errors may remain.)      "

## 2022-11-28 NOTE — PATIENT INSTRUCTIONS
Flu shot and COVID-19 booster today.    Fasting labs downstairs today.    The Cologuard kit (stool screen for colon cancer) will be sent to your home. Complete and mail back when able.     Refills sent in.

## 2022-12-16 LAB — NONINV COLON CA DNA+OCC BLD SCRN STL QL: NEGATIVE

## 2023-02-06 ENCOUNTER — VIRTUAL VISIT (OUTPATIENT)
Dept: URGENT CARE | Facility: CLINIC | Age: 73
End: 2023-02-06
Payer: COMMERCIAL

## 2023-02-06 ENCOUNTER — NURSE TRIAGE (OUTPATIENT)
Dept: INTERNAL MEDICINE | Facility: CLINIC | Age: 73
End: 2023-02-06

## 2023-02-06 DIAGNOSIS — U07.1 CLINICAL DIAGNOSIS OF COVID-19: Primary | ICD-10-CM

## 2023-02-06 PROCEDURE — 99213 OFFICE O/P EST LOW 20 MIN: CPT | Mod: CS

## 2023-02-06 NOTE — TELEPHONE ENCOUNTER
Spoke with patient regarding recent positive Covid test. See Triage and protocol assessment below.     Reason for Disposition    [1] HIGH RISK for severe COVID complications (e.g., weak immune system, age > 64 years, obesity with BMI of 30 or higher, pregnant, chronic lung disease or other chronic medical condition) AND [2] COVID symptoms (e.g., cough, fever)  (Exceptions: Already seen by PCP and no new or worsening symptoms.)    Additional Information    Negative: SEVERE difficulty breathing (e.g., struggling for each breath, speaks in single words)    Negative: Difficult to awaken or acting confused (e.g., disoriented, slurred speech)    Negative: Bluish (or gray) lips or face now    Negative: Shock suspected (e.g., cold/pale/clammy skin, too weak to stand, low BP, rapid pulse)    Negative: Sounds like a life-threatening emergency to the triager    Negative: [1] Diagnosed or suspected COVID-19 AND [2] symptoms lasting 3 or more weeks    Negative: [1] COVID-19 exposure AND [2] no symptoms    Negative: COVID-19 vaccine reaction suspected (e.g., fever, headache, muscle aches) occurring 1 to 3 days after getting vaccine    Negative: COVID-19 vaccine, questions about    Negative: [1] Lives with someone known to have influenza (flu test positive) AND [2] flu-like symptoms (e.g., cough, runny nose, sore throat, SOB; with or without fever)    Negative: [1] Adult with possible COVID-19 symptoms AND [2] triager concerned about severity of symptoms or other causes    Negative: COVID-19 and breastfeeding, questions about    Negative: SEVERE or constant chest pain or pressure  (Exception: Mild central chest pain, present only when coughing.)    Negative: MODERATE difficulty breathing (e.g., speaks in phrases, SOB even at rest, pulse 100-120)    Negative: Headache and stiff neck (can't touch chin to chest)    Negative: Oxygen level (e.g., pulse oximetry) 90 percent or lower    Negative: Chest pain or pressure  (Exception: MILD  central chest pain, present only when coughing)    Negative: Patient sounds very sick or weak to the triager    Negative: MILD difficulty breathing (e.g., minimal/no SOB at rest, SOB with walking, pulse <100)    Negative: Fever > 103 F (39.4 C)    Negative: [1] Fever > 101 F (38.3 C) AND [2] over 60 years of age    Negative: [1] Fever > 100.0 F (37.8 C) AND [2] bedridden (e.g., nursing home patient, CVA, chronic illness, recovering from surgery)    Protocols used: CORONAVIRUS (COVID-19) DIAGNOSED OR [id number]    RN COVID TREATMENT VISIT  02/06/23    Willis De La Cruz  72 year old  Current weight? 165 lbs    Has the patient been seen by a primary care provider at an [facility] or [facility] within the past two years? Yes.   Have you been in close proximity to/do you have a known exposure to a person with a confirmed case of influenza? No.     Date of positive COVID test (PCR or at home)?  2/6/2023    Current COVID symptoms: fever or chills, cough, fatigue, muscle or body aches, congestion or runny nose and nausea or vomiting    Date COVID symptoms began: 2/3/2023    Do you have any of the following conditions that place you at risk of being very sick from COVID-19? 65 years or older and cancer    Is patient eligible to continue? Yes, established patient, 12 years or older weighing at least 88.2 lbs, who has COVID symptoms that started in the past 5 days and is at risk for being very sick from COVID-19.       Have you received monoclonal antibodies or oral antiviral medications since testing positive to COVID-19? No    Are you currently hospitalized for COVID-19? No    Do you have a history of hepatitis? No    Are you currently pregnant or nursing? No    Do you have a clinically significant hypersensitivity to nirmatrelvir, ritonavir, or molnupiravir? No    Do you have any history of severe renal impairment (eGFR < 30mL/min)? No    Do you have any history of hepatic impairment or  abnormalities (e.g. hepatic panel, ALT, AST, ALK Phos, bilirubin)? No    Have you had a coronary stent placed in the previous 6 months? No    Is patient eligible to continue?   Yes, patient meets all eligibility requirements for the RN COVID treatment (as denoted by all no responses above).     Current Outpatient Medications   Medication Sig Dispense Refill     amLODIPine (NORVASC) 10 MG tablet Take 1 tablet (10 mg) by mouth daily 90 tablet 3     lisinopril (ZESTRIL) 40 MG tablet Take 1 tablet (40 mg) by mouth daily 90 tablet 3     omeprazole (PRILOSEC) 40 MG DR capsule Take 1 capsule (40 mg) by mouth daily 90 capsule 3     simvastatin (ZOCOR) 40 MG tablet Take 1 tablet (40 mg) by mouth At Bedtime 90 tablet 3     triamcinolone (KENALOG) 0.1 % external cream Apply topically 2 times daily 30 g 3       Medications from List 1 of the standing order (on medications that exclude the use of Paxlovid) that patient is taking: NONE. Is patient taking Conception's Wort? No  Is patient taking Toya's Wort or any meds from List 1? No.   Medications from List 2 of the standing order (on meds that provider needs to adjust) that patient is taking: amlodipine (Norvasc), explained a provider visit is necessary to discuss medication adjustments while taking Paxlovid. Is patient on any of the meds from List 2? Yes. Patient will be transferred to a  at the end of this call.   Justice L. Phoenix, RN

## 2023-02-06 NOTE — PROGRESS NOTES
"Monroe is a 72 year old who is being evaluated via a billable telephone visit.      Tested + this AM,  Sx started 2/5.  Chills and cough  COVID vaccinated and boosted.  Wife also +    What phone number would you like to be contacted at? cell  How would you like to obtain your AVS? MyChart    Distant Location (provider location):  Off-site    Assessment & Plan     Clinical diagnosis of COVID-19    - nirmatrelvir and ritonavir (PAXLOVID) therapy pack; Take 3 tablets by mouth 2 times daily for 5 days (Take 2 Nirmatrelvir tablets and 1 Ritonavir tablet twice daily for 5 days)    COVID-19 positive patient.  Encounter for consideration of medication intervention. Patient does qualify for a prescription. Full discussion with patient including medication options, risks and benefits. Potential drug interactions reviewed with patient.     Treatment Planned Paxlovid RX sent to [facility] and [address]    Temporary change to home medications:   Reduce amlodipine to 1/2 tab once daily x 5 days while on Paxlovid  Hold simvastatin while on Paxlovid and resume one week after last dose of Paxlovid (hold for total of 12 days)    Estimated body mass index is 25.37 kg/m  as calculated from the following:    Height as of 11/28/22: 1.74 m (5' 8.5\").    Weight as of 11/28/22: 76.8 kg (169 lb 4.8 oz).  GFR Estimate   Date Value Ref Range Status   11/28/2022 87 >60 mL/min/1.73m2 Final     Comment:     Effective December 21, 2021 eGFRcr in adults is calculated using the 2021 CKD-EPI creatinine equation which includes age and gender (Priscila et al., NEJM, DOI: 10.1056/UNCAqw3816712)   09/17/2020 70 >60 mL/min/[1.73_m2] Final     Comment:     Non  GFR Calc  Starting 12/18/2018, serum creatinine based estimated GFR (eGFR) will be   calculated using the Chronic Kidney Disease Epidemiology Collaboration   (CKD-EPI) equation.       Kellee Irvin MD  [facility]  [facility]    Subjective "   Monroe is a 72 year old, presenting for the following health issues:  No chief complaint on file.      HPI           Review of Systems   Constitutional, HEENT, cardiovascular, pulmonary, GI, , musculoskeletal, neuro, skin, endocrine and psych systems are negative, except as otherwise noted.      Objective           Vitals:  No vitals were obtained today due to virtual visit.    Physical Exam   healthy, alert and no distress  PSYCH: Alert and oriented times 3; coherent speech, normal   rate and volume, able to articulate logical thoughts, able   to abstract reason, no tangential thoughts, no hallucinations   or delusions  His affect is normal and pleasant  RESP: No cough, no audible wheezing, able to talk in full sentences  Remainder of exam unable to be completed due to telephone visits        Phone call duration: 11 minutes

## 2024-01-13 ENCOUNTER — HEALTH MAINTENANCE LETTER (OUTPATIENT)
Age: 74
End: 2024-01-13

## 2024-02-14 ENCOUNTER — OFFICE VISIT (OUTPATIENT)
Dept: INTERNAL MEDICINE | Facility: CLINIC | Age: 74
End: 2024-02-14
Payer: COMMERCIAL

## 2024-02-14 VITALS
HEART RATE: 75 BPM | SYSTOLIC BLOOD PRESSURE: 159 MMHG | HEIGHT: 69 IN | DIASTOLIC BLOOD PRESSURE: 77 MMHG | WEIGHT: 168.5 LBS | OXYGEN SATURATION: 98 % | BODY MASS INDEX: 24.96 KG/M2 | TEMPERATURE: 98.2 F

## 2024-02-14 DIAGNOSIS — Z13.1 SCREENING FOR DIABETES MELLITUS: ICD-10-CM

## 2024-02-14 DIAGNOSIS — H93.11 TINNITUS, RIGHT: ICD-10-CM

## 2024-02-14 DIAGNOSIS — I10 BENIGN ESSENTIAL HYPERTENSION: ICD-10-CM

## 2024-02-14 DIAGNOSIS — Z23 NEED FOR PROPHYLACTIC VACCINATION AND INOCULATION AGAINST INFLUENZA: ICD-10-CM

## 2024-02-14 DIAGNOSIS — K21.9 GASTROESOPHAGEAL REFLUX DISEASE WITHOUT ESOPHAGITIS: ICD-10-CM

## 2024-02-14 DIAGNOSIS — Z23 HIGH PRIORITY FOR 2019-NCOV VACCINE: ICD-10-CM

## 2024-02-14 DIAGNOSIS — H92.01 RIGHT EAR PAIN: ICD-10-CM

## 2024-02-14 DIAGNOSIS — R73.01 IMPAIRED FASTING GLUCOSE: ICD-10-CM

## 2024-02-14 DIAGNOSIS — E78.00 PURE HYPERCHOLESTEROLEMIA: ICD-10-CM

## 2024-02-14 DIAGNOSIS — Z12.5 SCREENING FOR PROSTATE CANCER: ICD-10-CM

## 2024-02-14 DIAGNOSIS — Z00.00 ROUTINE HISTORY AND PHYSICAL EXAMINATION OF ADULT: Primary | ICD-10-CM

## 2024-02-14 LAB
ALBUMIN SERPL BCG-MCNC: 4.8 G/DL (ref 3.5–5.2)
ALP SERPL-CCNC: 96 U/L (ref 40–150)
ALT SERPL W P-5'-P-CCNC: 19 U/L (ref 0–70)
ANION GAP SERPL CALCULATED.3IONS-SCNC: 13 MMOL/L (ref 7–15)
AST SERPL W P-5'-P-CCNC: 17 U/L (ref 0–45)
BILIRUB SERPL-MCNC: 0.5 MG/DL
BUN SERPL-MCNC: 10.4 MG/DL (ref 8–23)
CALCIUM SERPL-MCNC: 9.8 MG/DL (ref 8.8–10.2)
CHLORIDE SERPL-SCNC: 101 MMOL/L (ref 98–107)
CHOLEST SERPL-MCNC: 168 MG/DL
CREAT SERPL-MCNC: 0.98 MG/DL (ref 0.67–1.17)
DEPRECATED HCO3 PLAS-SCNC: 25 MMOL/L (ref 22–29)
EGFRCR SERPLBLD CKD-EPI 2021: 81 ML/MIN/1.73M2
FASTING STATUS PATIENT QL REPORTED: NO
GLUCOSE SERPL-MCNC: 115 MG/DL (ref 70–99)
HBA1C MFR BLD: 5.9 % (ref 0–5.6)
HDLC SERPL-MCNC: 41 MG/DL
LDLC SERPL CALC-MCNC: 72 MG/DL
NONHDLC SERPL-MCNC: 127 MG/DL
POTASSIUM SERPL-SCNC: 3.9 MMOL/L (ref 3.4–5.3)
PROT SERPL-MCNC: 7.8 G/DL (ref 6.4–8.3)
PSA SERPL DL<=0.01 NG/ML-MCNC: <0.01 NG/ML (ref 0–6.5)
SODIUM SERPL-SCNC: 139 MMOL/L (ref 135–145)
TRIGL SERPL-MCNC: 277 MG/DL

## 2024-02-14 PROCEDURE — 99397 PER PM REEVAL EST PAT 65+ YR: CPT | Mod: 25 | Performed by: INTERNAL MEDICINE

## 2024-02-14 PROCEDURE — G0103 PSA SCREENING: HCPCS | Performed by: INTERNAL MEDICINE

## 2024-02-14 PROCEDURE — 36415 COLL VENOUS BLD VENIPUNCTURE: CPT | Performed by: INTERNAL MEDICINE

## 2024-02-14 PROCEDURE — 83036 HEMOGLOBIN GLYCOSYLATED A1C: CPT | Performed by: INTERNAL MEDICINE

## 2024-02-14 PROCEDURE — 90480 ADMN SARSCOV2 VAC 1/ONLY CMP: CPT | Performed by: INTERNAL MEDICINE

## 2024-02-14 PROCEDURE — 99214 OFFICE O/P EST MOD 30 MIN: CPT | Mod: 25 | Performed by: INTERNAL MEDICINE

## 2024-02-14 PROCEDURE — 90471 IMMUNIZATION ADMIN: CPT | Performed by: INTERNAL MEDICINE

## 2024-02-14 PROCEDURE — 80053 COMPREHEN METABOLIC PANEL: CPT | Performed by: INTERNAL MEDICINE

## 2024-02-14 PROCEDURE — 80061 LIPID PANEL: CPT | Performed by: INTERNAL MEDICINE

## 2024-02-14 PROCEDURE — 91320 SARSCV2 VAC 30MCG TRS-SUC IM: CPT | Performed by: INTERNAL MEDICINE

## 2024-02-14 PROCEDURE — 90662 IIV NO PRSV INCREASED AG IM: CPT | Performed by: INTERNAL MEDICINE

## 2024-02-14 RX ORDER — AMLODIPINE BESYLATE 10 MG/1
10 TABLET ORAL DAILY
Qty: 90 TABLET | Refills: 3 | Status: SHIPPED | OUTPATIENT
Start: 2024-02-14

## 2024-02-14 RX ORDER — SIMVASTATIN 40 MG
40 TABLET ORAL AT BEDTIME
Qty: 90 TABLET | Refills: 3 | Status: SHIPPED | OUTPATIENT
Start: 2024-02-14

## 2024-02-14 RX ORDER — OMEPRAZOLE 40 MG/1
40 CAPSULE, DELAYED RELEASE ORAL DAILY
Qty: 90 CAPSULE | Refills: 3 | Status: SHIPPED | OUTPATIENT
Start: 2024-02-14

## 2024-02-14 RX ORDER — LISINOPRIL 40 MG/1
40 TABLET ORAL DAILY
Qty: 90 TABLET | Refills: 3 | Status: SHIPPED | OUTPATIENT
Start: 2024-02-14

## 2024-02-14 SDOH — HEALTH STABILITY: PHYSICAL HEALTH: ON AVERAGE, HOW MANY MINUTES DO YOU ENGAGE IN EXERCISE AT THIS LEVEL?: 30 MIN

## 2024-02-14 SDOH — HEALTH STABILITY: PHYSICAL HEALTH: ON AVERAGE, HOW MANY DAYS PER WEEK DO YOU ENGAGE IN MODERATE TO STRENUOUS EXERCISE (LIKE A BRISK WALK)?: 4 DAYS

## 2024-02-14 ASSESSMENT — SOCIAL DETERMINANTS OF HEALTH (SDOH): HOW OFTEN DO YOU GET TOGETHER WITH FRIENDS OR RELATIVES?: ONCE A WEEK

## 2024-02-14 NOTE — PROGRESS NOTES
ASSESSMENT/PLAN                                                       (Z00.00) Routine history and physical examination of adult  (primary encounter diagnosis)  Comment: PMH, PSH, FH, SH, medications, allergies, immunizations, and preventative health measures reviewed and updated as appropriate.  Plan: see below for plans.      (E78.00) Pure hypercholesterolemia  (R73.01) Impaired fasting glucose  (Z13.1) Screening for diabetes mellitus  (Z12.5) Screening for prostate cancer  Plan: non-fasting labs today.     (Z23) Need for prophylactic vaccination and inoculation against influenza  Comment: flu shot given today.       (Z23) High priority for 2019-nCoV vaccine  Comment: COVID-19 booster given today.     (I10) Benign essential hypertension  Comment: poorly-controlled on current regimen.   Plan: patient will monitor blood pressures regularly at home and contact MD if they continue to be elevated; already on max doses of lisinopril and amlodipine so 3rd agent will be needed.     (K21.9) Gastroesophageal reflux disease without esophagitis  Comment: well-controlled on current regimen.    Plan: continue present management; refills provided.     (H92.01) Right ear pain  (H93.11) Tinnitus, right  Plan: Adult ENT referral placed - patient to schedule.     Kim Durbin MD   [address] [address]  T: [phone], F: [phone]    REID De La Cruz is a very pleasant 73 year old male who presents for a physical.    ROS:  Constitutional: no unintentional weight loss or gain reported; no fevers, chills, or sweats  reported    Cardiovascular: no chest pain, palpitations, or edema reported  Respiratory: no cough, wheezing, shortness of breath, or dyspnea on exertion reported  Gastrointestinal: no nausea, vomiting, constipation, diarrhea, or abdominal pain reported  Genitourinary: no urinary frequency, urgency, dysuria,  or hematuria reported  Integumentary: no rash or pruritus reported  Musculoskeletal: no back pain, muscle pain, joint pain, or joint swelling reported  Neurologic: no focal weakness, numbness, or tingling reported  Hematologic: no easy bruising or bleeding reported  Endocrine: no heat or cold intolerance reported; no polyuria or polydipsia reported  Psychiatric: no anxiety or depression reported    Past Medical History:   Diagnosis Date    Acid reflux disease     Benign essential hypertension     History of prostate cancer 2010    Impaired fasting glucose     Pure hypercholesterolemia      Past Surgical History:   Procedure Laterality Date    COLONOSCOPY  04/28/2012    Procedure:COLONOSCOPY; COLONOSCOPY; Surgeon:ERNIE ESPINOSA; Location: GI    HERNIORRHAPHY INGUINAL  10/07/2013    Procedure: HERNIORRHAPHY INGUINAL;  RIGHT INGUINAL HERNIA REPAIR WITH MESH;  Surgeon: Marcus Becker MD;  Location:  SD    PROSTATECTOMY RETROPUBIC RADICAL  01/01/2010     Family History   Problem Relation Age of Onset    Hypertension Mother     Cerebrovascular Disease Mother     Kidney Cancer Father     Hypertension Sister     Hypertension Brother         x2 brothers (one has passed, one still alive)    Prostate Cancer Maternal Uncle     Diabetes Type 2  No family hx of     Colon Cancer No family hx of     Myocardial Infarction No family hx of     Coronary Artery Disease Early Onset No family hx of      Social History     Occupational History    Occupation: Retired - engineering   Tobacco Use    Smoking status: Never    Smokeless tobacco: Never   Vaping Use    Vaping Use: Never used   Substance and Sexual Activity    Alcohol use: Yes     Comment: rare    Drug use: No    Sexual activity: Yes   Social History Narrative    .    Two living sons. Step son passed away.    5 grandchildren.    Stays active and busy during the day.      Allergies   Allergen Reactions    Aspirin Hives    Nsaids Hives     Current Outpatient  "Medications   Medication Sig    amLODIPine (NORVASC) 10 MG tablet Take 1 tablet (10 mg) by mouth daily    lisinopril (ZESTRIL) 40 MG tablet Take 1 tablet (40 mg) by mouth daily    omeprazole (PRILOSEC) 40 MG DR capsule Take 1 capsule (40 mg) by mouth daily    simvastatin (ZOCOR) 40 MG tablet Take 1 tablet (40 mg) by mouth at bedtime    triamcinolone (KENALOG) 0.1 % external cream Apply topically 2 times daily     Immunization History   Administered Date(s) Administered    COVID-19 Bivalent 12+ (Pfizer) 11/28/2022    COVID-19 MONOVALENT 12+ (Pfizer) 03/04/2021, 03/25/2021, 11/18/2021    Influenza (IIV3) PF 12/06/2005, 11/01/2009, 10/01/2013    Influenza Vaccine 65+ (Fluzone HD) 01/28/2021, 11/18/2021, 11/28/2022    Pneumo Conj 13-V (2010&after) 05/04/2017    Pneumococcal 23 valent 07/09/2018    TD,PF 7+ (Tenivac) 12/06/2005    TDAP Vaccine (Adacel) 03/17/2016     PREVENTATIVE HEALTH                                                      BMI: within normal limits   Blood pressure: elevated on current regimen  Prostate CA Screening: DUE  Colon CA screening: up to date   Lung CA screening: n/a   AAA screening: n/a   Screening cholesterol: n/a - already being treated for this condition  Screening diabetes: DUE  STD testing: no risk factors present  Alcohol misuse screening: alcohol use reviewed - no intervention indicated at this time  Immunizations: reviewed;  flu shot and COVID-19 booster DUE    OBJECTIVE                                                      BP (!) 159/77   Pulse 75   Temp 98.2  F (36.8  C) (Temporal)   Ht 1.74 m (5' 8.5\")   Wt 76.4 kg (168 lb 8 oz)   SpO2 98%   BMI 25.25 kg/m    Constitutional: well-appearing  Head, Ears, and Eyes: normocephalic; normal external auditory canal and pinna; tympanic membranes visualized and normal; normal lids and conjunctivae  Neck: supple, symmetric, no thyromegaly or lymphadenopathy  Respiratory: normal respiratory effort; clear to auscultation " bilaterally  Cardiovascular: regular rate and rhythm; no edema  Gastrointestinal: soft, non-tender, and non-distended; no organomegaly or masses  Musculoskeletal: normal gait and station  Psych: normal judgment and insight; normal mood and affect; recent and remote memory intact    ---    (Note was completed, in part, with [employer] voice-recognition software. Documentation was reviewed, but some grammatical, spelling, and word errors may remain.)

## 2024-02-14 NOTE — PATIENT INSTRUCTIONS
COVID-19 booster and flu shot today.    Non-fasting labs today.     Please schedule an ENT evaluation of right ear symptoms:    [facility]  [facility]  [address], [place] [address]  Phone: [phone]    Blood pressure checks at home - set yourself up for success!     - use a good quality blood pressure machine (Omron brand name recommended)  - use an appropriately sized, upper arm/brachial blood pressure cuff   - wait 2-3 hours after taking your blood pressure medication before checking blood pressure   - take your blood pressure in a quiet room  - take your blood pressure while seated, sitting up straight, with your feet flat on the ground  - avoid stimuli right before checking (coffee, exercise, heated discussions)  - avoid stimuli during your blood pressure check (TV, talking, loud music)  - once seated and blood pressure cuff is on, wait 5 minutes before pushing the button    Goal blood pressures are 110s-130s/60s-80s. Please let me know if your blood pressures are consistently out of this range.    Consistently elevated blood pressures can result in heart failure.

## 2024-04-19 ENCOUNTER — OFFICE VISIT (OUTPATIENT)
Dept: INTERNAL MEDICINE | Facility: CLINIC | Age: 74
End: 2024-04-19
Payer: COMMERCIAL

## 2024-04-19 VITALS
DIASTOLIC BLOOD PRESSURE: 64 MMHG | RESPIRATION RATE: 16 BRPM | WEIGHT: 170.7 LBS | SYSTOLIC BLOOD PRESSURE: 122 MMHG | HEIGHT: 69 IN | OXYGEN SATURATION: 98 % | HEART RATE: 77 BPM | BODY MASS INDEX: 25.28 KG/M2

## 2024-04-19 DIAGNOSIS — I10 BENIGN ESSENTIAL HYPERTENSION: Primary | ICD-10-CM

## 2024-04-19 PROCEDURE — 99213 OFFICE O/P EST LOW 20 MIN: CPT | Performed by: INTERNAL MEDICINE

## 2024-04-19 PROCEDURE — 80048 BASIC METABOLIC PNL TOTAL CA: CPT | Performed by: INTERNAL MEDICINE

## 2024-04-19 PROCEDURE — 36415 COLL VENOUS BLD VENIPUNCTURE: CPT | Performed by: INTERNAL MEDICINE

## 2024-04-19 NOTE — PROGRESS NOTES
"  ASSESSMENT/PLAN                                                      (I10) Benign essential hypertension  (primary encounter diagnosis)  Comment: well-controlled on current regimen.    Plan: BMP today; provided this is within normal limits, continue present management.    Kim Durbin MD   [place] [address] [address] [address]  T: [phone], F: [phone]    SUBJECTIVE                                                      Willis De La Cruz is a very pleasant 73 year old male who presents for blood pressure follow-up:    Patient was started on chlorthalidone 25 mg daily for poorly controlled blood pressure.  His lisinopril 40 mg daily and amlodipine 10 mg daily were continued without change. Tolerating medication(s) well - no adverse side effects. Blood pressures improved and within normal limits today. Patient feels well - no complaints.    OBJECTIVE                                                      /64   Pulse 77   Resp 16   Ht 1.74 m (5' 8.5\")   Wt 77.4 kg (170 lb 11.2 oz)   SpO2 98%   BMI 25.58 kg/m    Constitutional: well-appearing  Psych: normal judgment and insight; normal mood and affect; recent and remote memory intact    ---    (Note documentation was completed, in part, with [employer] voice-recognition software. Documentation was reviewed, but some grammatical, spelling, and word errors may remain.)    "

## 2024-04-20 LAB
ANION GAP SERPL CALCULATED.3IONS-SCNC: 14 MMOL/L (ref 7–15)
BUN SERPL-MCNC: 18.4 MG/DL (ref 8–23)
CALCIUM SERPL-MCNC: 9.9 MG/DL (ref 8.8–10.2)
CHLORIDE SERPL-SCNC: 100 MMOL/L (ref 98–107)
CREAT SERPL-MCNC: 1.27 MG/DL (ref 0.67–1.17)
DEPRECATED HCO3 PLAS-SCNC: 26 MMOL/L (ref 22–29)
EGFRCR SERPLBLD CKD-EPI 2021: 59 ML/MIN/1.73M2
GLUCOSE SERPL-MCNC: 123 MG/DL (ref 70–99)
POTASSIUM SERPL-SCNC: 3.9 MMOL/L (ref 3.4–5.3)
SODIUM SERPL-SCNC: 140 MMOL/L (ref 135–145)

## 2024-04-21 DIAGNOSIS — N17.9 AKI (ACUTE KIDNEY INJURY) (H): Primary | ICD-10-CM

## 2024-04-21 DIAGNOSIS — I10 BENIGN ESSENTIAL HYPERTENSION: ICD-10-CM

## 2024-04-24 ENCOUNTER — TELEPHONE (OUTPATIENT)
Dept: INTERNAL MEDICINE | Facility: CLINIC | Age: 74
End: 2024-04-24
Payer: COMMERCIAL

## 2024-04-24 NOTE — TELEPHONE ENCOUNTER
"Attempted to contact pt to relay providers message from below.     On callback- please relay providers message from below.      Patient Contact    Attempt # 1    Was call answered?  Yes.  \"May I please speak with <patient name>\"  Is patient available?   No. Left message with Wife for patient to call me back.     FW: Notification of Unviewed Test Results  Received: Yesterday  Kim Durbin MD  P Ox Triage Im  Please discuss results with patient (see result note).    Thank you,    Blair       Slight decline in kidney function since last check. May be related to new blood pressure medication or mild dehydration.     Please make sure you are staying well-hydrated at all times.     Let's continue current medications for now and let's plan on a NON-fasting blood test in 3-4 months (lab only visit - please schedule at your convenience).   Written by Kim Durbin MD on 4/21/2024  9:34 PM CDT  "
Spoke with pt and relayed providers message. Pt stated understanding. Assisted pt in scheduling lab only visit in 4 months.    Rosemary Munguia RN    
H

## 2024-08-05 ENCOUNTER — LAB (OUTPATIENT)
Dept: LAB | Facility: CLINIC | Age: 74
End: 2024-08-05
Payer: COMMERCIAL

## 2024-08-05 DIAGNOSIS — N17.9 AKI (ACUTE KIDNEY INJURY) (H): ICD-10-CM

## 2024-08-05 DIAGNOSIS — N17.9 AKI (ACUTE KIDNEY INJURY) (H): Primary | ICD-10-CM

## 2024-08-05 DIAGNOSIS — I10 BENIGN ESSENTIAL HYPERTENSION: ICD-10-CM

## 2024-08-05 LAB
ANION GAP SERPL CALCULATED.3IONS-SCNC: 11 MMOL/L (ref 7–15)
BUN SERPL-MCNC: 18.6 MG/DL (ref 8–23)
CALCIUM SERPL-MCNC: 9.8 MG/DL (ref 8.8–10.4)
CHLORIDE SERPL-SCNC: 94 MMOL/L (ref 98–107)
CREAT SERPL-MCNC: 1.44 MG/DL (ref 0.67–1.17)
EGFRCR SERPLBLD CKD-EPI 2021: 51 ML/MIN/1.73M2
GLUCOSE SERPL-MCNC: 112 MG/DL (ref 70–99)
HCO3 SERPL-SCNC: 28 MMOL/L (ref 22–29)
POTASSIUM SERPL-SCNC: 4.2 MMOL/L (ref 3.4–5.3)
SODIUM SERPL-SCNC: 133 MMOL/L (ref 135–145)

## 2024-08-05 PROCEDURE — 36415 COLL VENOUS BLD VENIPUNCTURE: CPT

## 2024-08-05 PROCEDURE — 80048 BASIC METABOLIC PNL TOTAL CA: CPT

## 2024-08-07 ENCOUNTER — TELEPHONE (OUTPATIENT)
Dept: INTERNAL MEDICINE | Facility: CLINIC | Age: 74
End: 2024-08-07
Payer: COMMERCIAL

## 2024-08-07 NOTE — TELEPHONE ENCOUNTER
Spoke to patient and future lab and provider visit scheduled.       Joann Dubon RN  [facility]

## 2024-08-07 NOTE — TELEPHONE ENCOUNTER
"Wife answered phone, went to get patient and call disconnected. Writer called back, no answer.    Patient Contact    Attempt # 1    Was call answered?  No.  Left message on voicemail with information to call me back.              Unfortunately no improvement in kidney function.     Please STOP chlorthalidone.     Please CONTINUE amlodipine and lisinopril without change.     Let's plan on blood pressure follow-up in ~1 month with non-fasting labs 1-2 days beforehand.     Please call [phone] and ask to speak to a \"care team\" member (not a ) to schedule blood pressure follow-up with me. They know I have special spots that only my patients can use.   Written by Kim Durbin MD on 8/5/2024  6:36 PM CDT  "

## 2024-09-04 ENCOUNTER — LAB (OUTPATIENT)
Dept: LAB | Facility: CLINIC | Age: 74
End: 2024-09-04
Payer: COMMERCIAL

## 2024-09-04 DIAGNOSIS — N17.9 AKI (ACUTE KIDNEY INJURY) (H): ICD-10-CM

## 2024-09-04 LAB
ANION GAP SERPL CALCULATED.3IONS-SCNC: 11 MMOL/L (ref 7–15)
BUN SERPL-MCNC: 13.3 MG/DL (ref 8–23)
CALCIUM SERPL-MCNC: 10 MG/DL (ref 8.8–10.4)
CHLORIDE SERPL-SCNC: 106 MMOL/L (ref 98–107)
CREAT SERPL-MCNC: 1.32 MG/DL (ref 0.67–1.17)
EGFRCR SERPLBLD CKD-EPI 2021: 56 ML/MIN/1.73M2
GLUCOSE SERPL-MCNC: 110 MG/DL (ref 70–99)
HCO3 SERPL-SCNC: 26 MMOL/L (ref 22–29)
POTASSIUM SERPL-SCNC: 4.7 MMOL/L (ref 3.4–5.3)
SODIUM SERPL-SCNC: 143 MMOL/L (ref 135–145)

## 2024-09-04 PROCEDURE — 36415 COLL VENOUS BLD VENIPUNCTURE: CPT

## 2024-09-04 PROCEDURE — 80048 BASIC METABOLIC PNL TOTAL CA: CPT

## 2024-09-05 ENCOUNTER — OFFICE VISIT (OUTPATIENT)
Dept: INTERNAL MEDICINE | Facility: CLINIC | Age: 74
End: 2024-09-05
Payer: COMMERCIAL

## 2024-09-05 VITALS
OXYGEN SATURATION: 97 % | WEIGHT: 168.5 LBS | BODY MASS INDEX: 25.25 KG/M2 | TEMPERATURE: 98.7 F | HEART RATE: 79 BPM | SYSTOLIC BLOOD PRESSURE: 130 MMHG | DIASTOLIC BLOOD PRESSURE: 70 MMHG

## 2024-09-05 DIAGNOSIS — N18.31 STAGE 3A CHRONIC KIDNEY DISEASE (H): ICD-10-CM

## 2024-09-05 DIAGNOSIS — Z23 NEED FOR PROPHYLACTIC VACCINATION AND INOCULATION AGAINST INFLUENZA: ICD-10-CM

## 2024-09-05 DIAGNOSIS — I10 BENIGN ESSENTIAL HYPERTENSION: Primary | ICD-10-CM

## 2024-09-05 PROCEDURE — 90471 IMMUNIZATION ADMIN: CPT | Performed by: INTERNAL MEDICINE

## 2024-09-05 PROCEDURE — 99214 OFFICE O/P EST MOD 30 MIN: CPT | Mod: 25 | Performed by: INTERNAL MEDICINE

## 2024-09-05 PROCEDURE — 90662 IIV NO PRSV INCREASED AG IM: CPT | Performed by: INTERNAL MEDICINE

## 2024-09-05 NOTE — PROGRESS NOTES
ASSESSMENT/PLAN                                                      (I10) Benign essential hypertension  (primary encounter diagnosis)  (N18.31) Stage 3a chronic kidney disease (H)  Comment: blood pressure is well-controlled on current regimen; while lisinopril may be causing or contributing to the mildly decreased kidney function, it is also renal protective and keeping his blood pressure within range.  Plan: continue amlodipine and lisinopril without change; patient will continue monitoring blood pressures regularly and contact MD if they are elevated; we will continue to monitor kidney function regularly (every 6-12 months).    (Z23) Need for prophylactic vaccination and inoculation against influenza  Comment: Flu shot given today.    The longitudinal plan of care for the diagnosis(es)/condition(s) as documented were addressed during this visit. Due to the added complexity in care, I will continue to support Monroe in the subsequent management and with ongoing continuity of care.    Kim Durbin MD   [address] [address]  T: [phone], F: [phone]    SUBJECTIVE                                                      Willis De La Cruz is a very pleasant 74 year old male who presents for follow-up:    A slight decline in the patient's kidney function was noted earlier this year. Cr/GFR 0.98/81 in February. 1.27/59 in April after adding chlorthalidone to regimen. 1.44/51 on repeat. 1.32/56 yesterday after stopping chlorthalidone a month ago (and continuing lisinopril 40 mg daily and amlodipine 10 mg daily).  Here today for blood pressure recheck.    Blood pressure is within normal limits today. Patient reports the SBPs at home are in the 130s-140s. He is asymptomatic from a blood pressure perspective: no chest pain or palpitations, no shortness of breath, no light-headedness or dizziness, no headaches or vision changes.     OBJECTIVE                                                       /70   Pulse 79   Temp 98.7  F (37.1  C) (Temporal)   Wt 76.4 kg (168 lb 8 oz)   SpO2 97%   BMI 25.25 kg/m    Constitutional: well-appearing  Psych: normal judgment and insight; normal mood and affect; recent and remote memory intact    ---    (Note documentation was completed, in part, with [employer] voice-recognition software. Documentation was reviewed, but some grammatical, spelling, and word errors may remain.)

## 2025-01-29 ENCOUNTER — VIRTUAL VISIT (OUTPATIENT)
Dept: INTERNAL MEDICINE | Facility: CLINIC | Age: 75
End: 2025-01-29
Payer: COMMERCIAL

## 2025-01-29 ENCOUNTER — ANCILLARY PROCEDURE (OUTPATIENT)
Dept: GENERAL RADIOLOGY | Facility: CLINIC | Age: 75
End: 2025-01-29
Attending: INTERNAL MEDICINE
Payer: COMMERCIAL

## 2025-01-29 DIAGNOSIS — J06.9 ACUTE URI: Primary | ICD-10-CM

## 2025-01-29 DIAGNOSIS — J06.9 ACUTE URI: ICD-10-CM

## 2025-01-29 DIAGNOSIS — N18.31 STAGE 3A CHRONIC KIDNEY DISEASE (H): ICD-10-CM

## 2025-01-29 PROCEDURE — 98013 SYNCH AUDIO-ONLY EST LOW 20: CPT | Performed by: INTERNAL MEDICINE

## 2025-01-29 PROCEDURE — 71046 X-RAY EXAM CHEST 2 VIEWS: CPT | Mod: TC | Performed by: INTERNAL MEDICINE

## 2025-01-29 NOTE — PROGRESS NOTES
Monroe is a 74 year old who is being evaluated via a billable telephone visit.    What phone number would you like to be contacted at? [phone]   How would you like to obtain your AVS? [place]  Originating Location (pt. Location): Home  Distant Location (provider location):  Off-site  Telephone visit completed due to the patient did not have access to video, while the distant provider did.    Assessment & Plan   Acute URI  Cough ongoing off/on for ~1 month. Unclear if that's related to this acute URI. All symptoms acutely started or worsened 2 days ago. Manageable with OTC symptomatic cares. No systemic symptoms. Home COVID test negative at start of symptoms. Offered COVID, influenza, RSV testing today, but patient declined. Recommended CXR, patient accepted. Discussed if this shows PNA, will treat with antibiotics. If reassuring, likely viral URI that he can manage at home. If no improvement or symptoms worsen over the next few days, recommend in-person evaluation with any available provider.  - XR Chest 2 Views; Future    Stage 3a chronic kidney disease (H)  Known issue that I take into account for their medical decisions, no current exacerbations or new concerns.    F/u with regular PCP at regular interval or sooner PRN    Signed Electronically by:  Omi Garcia MD, MPH  [facility]  Internal Medicine    Subjective   Monroe is a 74 year old who presents for a same day acute care virtual telephone visit with chief concern of: URI    History of Present Illness       Reason for visit:  URI  Symptom onset:  3-7 days ago  Symptoms include:  Cough that is going on and can not get rid of      Cough off/on for 1 month. Acutely worsened 2 days ago. Sinus symptoms started 2 days ago. Mucinex helps cough. When lays down, cough worsens. No known sick contacts. Home COVID test is negative 2 days ago. No muscle aches/body aches. Some SOB. Cough worst symptom. Mild wheezing. Of note, patient  was on amoxicillin for 10 days ~3 weeks ago.        Objective       Vitals: No vitals were obtained today due to virtual visit.    Physical Exam   General: Alert and no distress //Respiratory: No audible wheeze, cough, or shortness of breath //Psychiatric: Appropriate affect, tone, and pace of words    Phone call duration: 7 minutes

## 2025-02-14 SDOH — HEALTH STABILITY: PHYSICAL HEALTH: ON AVERAGE, HOW MANY MINUTES DO YOU ENGAGE IN EXERCISE AT THIS LEVEL?: 20 MIN

## 2025-02-14 SDOH — HEALTH STABILITY: PHYSICAL HEALTH: ON AVERAGE, HOW MANY DAYS PER WEEK DO YOU ENGAGE IN MODERATE TO STRENUOUS EXERCISE (LIKE A BRISK WALK)?: 3 DAYS

## 2025-02-14 ASSESSMENT — SOCIAL DETERMINANTS OF HEALTH (SDOH): HOW OFTEN DO YOU GET TOGETHER WITH FRIENDS OR RELATIVES?: ONCE A WEEK

## 2025-02-19 ENCOUNTER — OFFICE VISIT (OUTPATIENT)
Dept: INTERNAL MEDICINE | Facility: CLINIC | Age: 75
End: 2025-02-19
Payer: COMMERCIAL

## 2025-02-19 VITALS
OXYGEN SATURATION: 100 % | DIASTOLIC BLOOD PRESSURE: 62 MMHG | SYSTOLIC BLOOD PRESSURE: 146 MMHG | TEMPERATURE: 97.4 F | HEIGHT: 69 IN | HEART RATE: 99 BPM | BODY MASS INDEX: 23.86 KG/M2 | WEIGHT: 161.1 LBS

## 2025-02-19 DIAGNOSIS — Z00.00 ROUTINE HISTORY AND PHYSICAL EXAMINATION OF ADULT: Primary | ICD-10-CM

## 2025-02-19 DIAGNOSIS — N18.31 STAGE 3A CHRONIC KIDNEY DISEASE (H): ICD-10-CM

## 2025-02-19 DIAGNOSIS — I10 BENIGN ESSENTIAL HYPERTENSION: ICD-10-CM

## 2025-02-19 DIAGNOSIS — E78.00 PURE HYPERCHOLESTEROLEMIA: ICD-10-CM

## 2025-02-19 DIAGNOSIS — Z85.46 HISTORY OF PROSTATE CANCER: ICD-10-CM

## 2025-02-19 DIAGNOSIS — R73.01 IMPAIRED FASTING GLUCOSE: ICD-10-CM

## 2025-02-19 DIAGNOSIS — Z23 HIGH PRIORITY FOR 2019-NCOV VACCINE: ICD-10-CM

## 2025-02-19 DIAGNOSIS — K21.9 GASTROESOPHAGEAL REFLUX DISEASE WITHOUT ESOPHAGITIS: ICD-10-CM

## 2025-02-19 LAB
ALBUMIN SERPL BCG-MCNC: 4.7 G/DL (ref 3.5–5.2)
ALP SERPL-CCNC: 94 U/L (ref 40–150)
ALT SERPL W P-5'-P-CCNC: 15 U/L (ref 0–70)
ANION GAP SERPL CALCULATED.3IONS-SCNC: 13 MMOL/L (ref 7–15)
AST SERPL W P-5'-P-CCNC: 17 U/L (ref 0–45)
BILIRUB SERPL-MCNC: 0.4 MG/DL
BUN SERPL-MCNC: 12 MG/DL (ref 8–23)
CALCIUM SERPL-MCNC: 9.8 MG/DL (ref 8.8–10.4)
CHLORIDE SERPL-SCNC: 103 MMOL/L (ref 98–107)
CHOLEST SERPL-MCNC: 169 MG/DL
CREAT SERPL-MCNC: 1.04 MG/DL (ref 0.67–1.17)
EGFRCR SERPLBLD CKD-EPI 2021: 75 ML/MIN/1.73M2
EST. AVERAGE GLUCOSE BLD GHB EST-MCNC: 123 MG/DL
FASTING STATUS PATIENT QL REPORTED: YES
FASTING STATUS PATIENT QL REPORTED: YES
GLUCOSE SERPL-MCNC: 122 MG/DL (ref 70–99)
HBA1C MFR BLD: 5.9 % (ref 0–5.6)
HCO3 SERPL-SCNC: 25 MMOL/L (ref 22–29)
HDLC SERPL-MCNC: 46 MG/DL
LDLC SERPL CALC-MCNC: 70 MG/DL
NONHDLC SERPL-MCNC: 123 MG/DL
POTASSIUM SERPL-SCNC: 3.9 MMOL/L (ref 3.4–5.3)
PROT SERPL-MCNC: 8.1 G/DL (ref 6.4–8.3)
SODIUM SERPL-SCNC: 141 MMOL/L (ref 135–145)
TRIGL SERPL-MCNC: 267 MG/DL

## 2025-02-19 PROCEDURE — 80053 COMPREHEN METABOLIC PANEL: CPT | Performed by: INTERNAL MEDICINE

## 2025-02-19 PROCEDURE — 90480 ADMN SARSCOV2 VAC 1/ONLY CMP: CPT | Performed by: INTERNAL MEDICINE

## 2025-02-19 PROCEDURE — 99214 OFFICE O/P EST MOD 30 MIN: CPT | Mod: 25 | Performed by: INTERNAL MEDICINE

## 2025-02-19 PROCEDURE — 80061 LIPID PANEL: CPT | Performed by: INTERNAL MEDICINE

## 2025-02-19 PROCEDURE — G2211 COMPLEX E/M VISIT ADD ON: HCPCS | Performed by: INTERNAL MEDICINE

## 2025-02-19 PROCEDURE — 36415 COLL VENOUS BLD VENIPUNCTURE: CPT | Performed by: INTERNAL MEDICINE

## 2025-02-19 PROCEDURE — 91320 SARSCV2 VAC 30MCG TRS-SUC IM: CPT | Performed by: INTERNAL MEDICINE

## 2025-02-19 PROCEDURE — 83036 HEMOGLOBIN GLYCOSYLATED A1C: CPT | Performed by: INTERNAL MEDICINE

## 2025-02-19 PROCEDURE — G0439 PPPS, SUBSEQ VISIT: HCPCS | Performed by: INTERNAL MEDICINE

## 2025-02-19 RX ORDER — SIMVASTATIN 40 MG
40 TABLET ORAL AT BEDTIME
Qty: 90 TABLET | Refills: 3 | Status: SHIPPED | OUTPATIENT
Start: 2025-02-19

## 2025-02-19 RX ORDER — LISINOPRIL 40 MG/1
40 TABLET ORAL DAILY
Qty: 90 TABLET | Refills: 3 | Status: SHIPPED | OUTPATIENT
Start: 2025-02-19

## 2025-02-19 RX ORDER — AMLODIPINE BESYLATE 10 MG/1
10 TABLET ORAL DAILY
Qty: 90 TABLET | Refills: 3 | Status: SHIPPED | OUTPATIENT
Start: 2025-02-19

## 2025-02-19 RX ORDER — CARVEDILOL 6.25 MG/1
6.25 TABLET ORAL 2 TIMES DAILY WITH MEALS
Qty: 180 TABLET | Refills: 3 | Status: SHIPPED | OUTPATIENT
Start: 2025-02-19

## 2025-02-19 RX ORDER — OMEPRAZOLE 40 MG/1
40 CAPSULE, DELAYED RELEASE ORAL DAILY
Qty: 90 CAPSULE | Refills: 3 | Status: SHIPPED | OUTPATIENT
Start: 2025-02-19

## 2025-02-19 NOTE — PATIENT INSTRUCTIONS
Blood pressure checks at home - set yourself up for success!     - use a good quality blood pressure machine (Omron brand name recommended)  - use an appropriately sized, upper arm/brachial blood pressure cuff   - wait 2-3 hours after taking your blood pressure medication before checking blood pressure   - take your blood pressure in a quiet room  - take your blood pressure while seated, sitting up straight, with your feet flat on the ground  - avoid stimuli right before checking (coffee, exercise, heated discussions)  - avoid stimuli during your blood pressure check (TV, talking, loud music)  - once seated and blood pressure cuff is on, wait 5 minutes before pushing the button    Goal blood pressures are 110s-130s/60s-80s. Goal heart rates 60s-80s. Please let me know if your blood pressures and/or heart rates are consistently out of this range.

## 2025-02-19 NOTE — PROGRESS NOTES
ASSESSMENT/PLAN                                                       (Z00.00) Routine history and physical examination of adult  (primary encounter diagnosis)  Comment: PMH, PSH, FH, SH, medications, allergies, immunizations, and preventative health measures reviewed and updated as appropriate.  Plan: see below for plans.      (I10) Benign essential hypertension  Comment: suboptimally-controlled on amlodipine and lisinopril.   Plan: continue amlodipine and lisinopril without change; refills provided; START Coreg 6.25mg twice daily.    (K21.9) Gastroesophageal reflux disease without esophagitis  Comment: well-controlled on omeprazole.   Plan: continue present management; refills provided.     (Z85.46) History of prostate cancer  Comment: 2010; s/p prostatectomy.     (E78.00) Pure hypercholesterolemia  (R73.01) Impaired fasting glucose  (N18.31) Stage 3a chronic kidney disease (H)  Plan: fasting labs today; recommendations to follow; for now, continue present management; refills provided.      (Z23) High priority for 2019-nCoV vaccine  Comment: COVID-19 booster given today.     Kim Durbin MD   [address] [address]  T: [phone], F: [phone]    SUBJECTIVE                                                      Willis De La Cruz is a very pleasant 74 year old male who presents for a physical.    ROS:  Constitutional: no unintentional weight loss or gain reported; no fevers, chills, or sweats  reported    Cardiovascular: no chest pain, palpitations, or edema reported  Respiratory: no cough, wheezing, shortness of breath, or dyspnea on exertion reported  Gastrointestinal: no nausea, vomiting, constipation, diarrhea, or abdominal pain reported  Genitourinary: no urinary frequency, urgency, dysuria, or hematuria reported  Integumentary: no rash or pruritus reported  Musculoskeletal: no back pain, muscle pain, joint pain, or joint swelling reported  Neurologic: no focal  weakness, numbness, or tingling reported  Hematologic: no easy bruising or bleeding reported  Endocrine: no heat or cold intolerance reported; no polyuria or polydipsia reported  Psychiatric: no anxiety or depression reported    Past Medical History:   Diagnosis Date    Acid reflux disease     Benign essential hypertension     Chronic kidney disease, stage III (moderate) (H)     History of prostate cancer 2010    Impaired fasting glucose     Pure hypercholesterolemia      Past Surgical History:   Procedure Laterality Date    COLONOSCOPY  04/28/2012    Procedure:COLONOSCOPY; COLONOSCOPY; Surgeon:ERNIE ESPINOSA; Location: [facility]    HERNIORRHAPHY INGUINAL  10/07/2013    Procedure: HERNIORRHAPHY INGUINAL;  RIGHT INGUINAL HERNIA REPAIR WITH MESH;  Surgeon: Marcus Becker MD;  Location:  [facility]    PROSTATECTOMY RETROPUBIC RADICAL  01/01/2010     Family History   Problem Relation Age of Onset    Hypertension Mother     Cerebrovascular Disease Mother     Kidney Cancer Father     Hypertension Sister     Hypertension Brother         x2 brothers (one has passed, one still alive)    Prostate Cancer Maternal Uncle     Diabetes Type 2  No family hx of     Colon Cancer No family hx of     Myocardial Infarction No family hx of     Coronary Artery Disease Early Onset No family hx of      Social History     Occupational History    Occupation: Retired - engineering   Tobacco Use    Smoking status: Never    Smokeless tobacco: Never   Vaping Use    Vaping status: Never Used   Substance and Sexual Activity    Alcohol use: Yes     Comment: rare    Drug use: No    Sexual activity: Yes   Social History Narrative    .    Two sons.     5 grandchildren.    Stays active and busy during the day.      Allergies   Allergen Reactions    Aspirin Hives    Nsaids Hives     Current Outpatient Medications   Medication Sig    amLODIPine (NORVASC) 10 MG tablet Take 1 tablet (10 mg) by mouth daily    lisinopril (ZESTRIL) 40 MG tablet Take 1  "tablet (40 mg) by mouth daily    omeprazole (PRILOSEC) 40 MG DR capsule Take 1 capsule (40 mg) by mouth daily    simvastatin (ZOCOR) 40 MG tablet Take 1 tablet (40 mg) by mouth at bedtime    triamcinolone (KENALOG) 0.1 % external cream Apply topically 2 times daily     Immunization History   Administered Date(s) Administered    COVID-19 12+ (Pfizer) 02/14/2024    COVID-19 Bivalent 12+ (Pfizer) 11/28/2022    COVID-19 MONOVALENT 12+ (Pfizer) 03/04/2021, 03/25/2021, 11/18/2021    Influenza (High Dose) Trivalent,PF (Fluzone) 09/05/2024    Influenza (IIV3) PF 12/06/2005, 11/01/2009, 10/01/2013    Influenza Vaccine 65+ (Fluzone HD) 01/28/2021, 11/18/2021, 11/28/2022, 02/14/2024    Pneumo Conj 13-V (2010&after) 05/04/2017    Pneumococcal 23 valent 07/09/2018    TD,PF 7+ (Tenivac) 12/06/2005    TDAP Vaccine (Adacel) 03/17/2016     PREVENTATIVE HEALTH                                                      BMI: within normal limits   Blood pressure: elevated on current regimen  Prostate CA Screening: screening no longer indicated  Colon CA screening: screening no longer indicated  Lung CA screening: n/a   AAA screening: n/a   Screening cholesterol: n/a - already being treated for this condition  Screening diabetes: DUE  STD testing: no risk factors present  Alcohol misuse screening: alcohol use reviewed - no intervention indicated at this time  Immunizations: reviewed;  COVID-19 booster DUE    OBJECTIVE                                                      BP (!) 146/62   Pulse 99   Temp 97.4  F (36.3  C) (Temporal)   Ht 1.74 m (5' 8.5\")   Wt 73.1 kg (161 lb 1.6 oz)   SpO2 100%   BMI 24.14 kg/m    Constitutional: well-appearing  Head, Ears, and Eyes: normocephalic; normal external auditory canal and pinna; tympanic membranes visualized and normal; normal lids and conjunctivae  Neck: supple, symmetric, no thyromegaly or lymphadenopathy  Respiratory: normal respiratory effort; clear to auscultation " bilaterally  Cardiovascular: regular rate and rhythm; no edema  Gastrointestinal: soft, non-tender, and non-distended; no organomegaly or masses  Musculoskeletal: normal gait and station  Psych: normal judgment and insight; normal mood and affect; recent and remote memory intact    ---    (Note was completed, in part, with [employer] voice-recognition software. Documentation was reviewed, but some grammatical, spelling, and word errors may remain.)

## 2025-08-16 ENCOUNTER — HEALTH MAINTENANCE LETTER (OUTPATIENT)
Age: 75
End: 2025-08-16